# Patient Record
Sex: FEMALE | Race: WHITE | NOT HISPANIC OR LATINO | Employment: FULL TIME | URBAN - METROPOLITAN AREA
[De-identification: names, ages, dates, MRNs, and addresses within clinical notes are randomized per-mention and may not be internally consistent; named-entity substitution may affect disease eponyms.]

---

## 2017-01-01 ENCOUNTER — COMMUNICATION - HEALTHEAST (OUTPATIENT)
Dept: MULTI SPECIALTY CLINIC | Facility: CLINIC | Age: 57
End: 2017-01-01

## 2017-01-01 DIAGNOSIS — C73 MALIGNANT NEOPLASM OF THYROID GLAND (H): ICD-10-CM

## 2017-01-03 ENCOUNTER — COMMUNICATION - HEALTHEAST (OUTPATIENT)
Dept: MULTI SPECIALTY CLINIC | Facility: CLINIC | Age: 57
End: 2017-01-03

## 2017-01-03 DIAGNOSIS — C73 MALIGNANT NEOPLASM OF THYROID GLAND (H): ICD-10-CM

## 2017-05-17 ENCOUNTER — OFFICE VISIT - HEALTHEAST (OUTPATIENT)
Dept: FAMILY MEDICINE | Facility: CLINIC | Age: 57
End: 2017-05-17

## 2017-05-17 DIAGNOSIS — Z79.899 PRESCRIPTION DRUG STARTED: ICD-10-CM

## 2017-05-17 LAB
ATRIAL RATE - MUSE: 60 BPM
DIASTOLIC BLOOD PRESSURE - MUSE: NORMAL MMHG
INTERPRETATION ECG - MUSE: NORMAL
P AXIS - MUSE: 48 DEGREES
PR INTERVAL - MUSE: 164 MS
QRS DURATION - MUSE: 98 MS
QT - MUSE: 444 MS
QTC - MUSE: 444 MS
R AXIS - MUSE: 30 DEGREES
SYSTOLIC BLOOD PRESSURE - MUSE: NORMAL MMHG
T AXIS - MUSE: 72 DEGREES
VENTRICULAR RATE- MUSE: 60 BPM

## 2017-05-17 ASSESSMENT — MIFFLIN-ST. JEOR: SCORE: 1695.78

## 2017-05-18 ENCOUNTER — RECORDS - HEALTHEAST (OUTPATIENT)
Dept: ADMINISTRATIVE | Facility: OTHER | Age: 57
End: 2017-05-18

## 2017-05-18 ENCOUNTER — COMMUNICATION - HEALTHEAST (OUTPATIENT)
Dept: FAMILY MEDICINE | Facility: CLINIC | Age: 57
End: 2017-05-18

## 2017-07-13 ENCOUNTER — COMMUNICATION - HEALTHEAST (OUTPATIENT)
Dept: FAMILY MEDICINE | Facility: CLINIC | Age: 57
End: 2017-07-13

## 2017-07-19 ENCOUNTER — OFFICE VISIT - HEALTHEAST (OUTPATIENT)
Dept: FAMILY MEDICINE | Facility: CLINIC | Age: 57
End: 2017-07-19

## 2017-07-19 DIAGNOSIS — E66.9 OBESITY (BMI 35.0-39.9 WITHOUT COMORBIDITY): ICD-10-CM

## 2017-07-19 DIAGNOSIS — I10 HTN (HYPERTENSION): ICD-10-CM

## 2017-07-19 DIAGNOSIS — K21.9 GERD (GASTROESOPHAGEAL REFLUX DISEASE): ICD-10-CM

## 2017-08-07 ENCOUNTER — COMMUNICATION - HEALTHEAST (OUTPATIENT)
Dept: FAMILY MEDICINE | Facility: CLINIC | Age: 57
End: 2017-08-07

## 2017-08-07 DIAGNOSIS — K21.9 GERD (GASTROESOPHAGEAL REFLUX DISEASE): ICD-10-CM

## 2017-08-07 DIAGNOSIS — I10 HTN (HYPERTENSION): ICD-10-CM

## 2017-08-07 DIAGNOSIS — E66.9 OBESITY (BMI 35.0-39.9 WITHOUT COMORBIDITY): ICD-10-CM

## 2017-08-29 ENCOUNTER — OFFICE VISIT - HEALTHEAST (OUTPATIENT)
Dept: FAMILY MEDICINE | Facility: CLINIC | Age: 57
End: 2017-08-29

## 2017-08-29 DIAGNOSIS — E78.5 HYPERLIPIDEMIA: ICD-10-CM

## 2017-08-29 DIAGNOSIS — K21.00 REFLUX ESOPHAGITIS: ICD-10-CM

## 2017-08-29 DIAGNOSIS — I10 ESSENTIAL HYPERTENSION: ICD-10-CM

## 2017-08-29 DIAGNOSIS — E89.0 POSTSURGICAL HYPOTHYROIDISM: ICD-10-CM

## 2017-08-29 NOTE — ASSESSMENT & PLAN NOTE
HTN  Will monitor in setting of phentermine use.  BP Readings from Last3 Encounters:   08/29/17 124/80   07/19/17 126/84   05/17/17 124/78     Continue lisinopril 10 mg po q day.   Continue hctz 25 mg po q day.

## 2017-09-24 ENCOUNTER — COMMUNICATION - HEALTHEAST (OUTPATIENT)
Dept: FAMILY MEDICINE | Facility: CLINIC | Age: 57
End: 2017-09-24

## 2017-09-24 DIAGNOSIS — E66.9 OBESITY (BMI 35.0-39.9 WITHOUT COMORBIDITY): ICD-10-CM

## 2017-09-24 DIAGNOSIS — K21.9 GERD (GASTROESOPHAGEAL REFLUX DISEASE): ICD-10-CM

## 2017-09-24 DIAGNOSIS — I10 HTN (HYPERTENSION): ICD-10-CM

## 2017-09-27 ENCOUNTER — COMMUNICATION - HEALTHEAST (OUTPATIENT)
Dept: FAMILY MEDICINE | Facility: CLINIC | Age: 57
End: 2017-09-27

## 2017-10-01 ENCOUNTER — COMMUNICATION - HEALTHEAST (OUTPATIENT)
Dept: MULTI SPECIALTY CLINIC | Facility: CLINIC | Age: 57
End: 2017-10-01

## 2017-10-01 DIAGNOSIS — C73 MALIGNANT NEOPLASM OF THYROID GLAND (H): ICD-10-CM

## 2017-10-05 ENCOUNTER — OFFICE VISIT - HEALTHEAST (OUTPATIENT)
Dept: FAMILY MEDICINE | Facility: CLINIC | Age: 57
End: 2017-10-05

## 2017-10-05 ENCOUNTER — COMMUNICATION - HEALTHEAST (OUTPATIENT)
Dept: MULTI SPECIALTY CLINIC | Facility: CLINIC | Age: 57
End: 2017-10-05

## 2017-10-05 ENCOUNTER — COMMUNICATION - HEALTHEAST (OUTPATIENT)
Dept: FAMILY MEDICINE | Facility: CLINIC | Age: 57
End: 2017-10-05

## 2017-10-05 DIAGNOSIS — Z13.1 SCREENING FOR DIABETES MELLITUS: ICD-10-CM

## 2017-10-05 DIAGNOSIS — C73 MALIGNANT NEOPLASM OF THYROID GLAND (H): ICD-10-CM

## 2017-10-05 DIAGNOSIS — I10 ESSENTIAL HYPERTENSION: ICD-10-CM

## 2017-10-05 DIAGNOSIS — E89.0 POSTSURGICAL HYPOTHYROIDISM: ICD-10-CM

## 2017-10-05 DIAGNOSIS — E78.5 HYPERLIPIDEMIA: ICD-10-CM

## 2017-10-05 DIAGNOSIS — K21.00 REFLUX ESOPHAGITIS: ICD-10-CM

## 2017-10-05 ASSESSMENT — MIFFLIN-ST. JEOR: SCORE: 1664.03

## 2017-10-05 NOTE — ASSESSMENT & PLAN NOTE
BP Readings from Last 3 Encounters:   10/05/17 122/76   08/29/17 124/80   07/19/17 126/84     Continue lisinopril 10 mg po q day.   Continue hctz 25 mg po q day.

## 2017-10-05 NOTE — ASSESSMENT & PLAN NOTE
Managed by endocrine.  Lab Results   Component Value Date    TSH 1.17 08/07/2017      Taking synthroid 150 mcg daily

## 2017-11-06 ENCOUNTER — COMMUNICATION - HEALTHEAST (OUTPATIENT)
Dept: FAMILY MEDICINE | Facility: CLINIC | Age: 57
End: 2017-11-06

## 2017-11-06 DIAGNOSIS — E66.9 OBESITY (BMI 35.0-39.9 WITHOUT COMORBIDITY): ICD-10-CM

## 2017-11-06 DIAGNOSIS — I10 HTN (HYPERTENSION): ICD-10-CM

## 2017-11-06 DIAGNOSIS — K21.9 GERD (GASTROESOPHAGEAL REFLUX DISEASE): ICD-10-CM

## 2017-11-09 ENCOUNTER — OFFICE VISIT - HEALTHEAST (OUTPATIENT)
Dept: FAMILY MEDICINE | Facility: CLINIC | Age: 57
End: 2017-11-09

## 2017-11-09 DIAGNOSIS — Z13.1 SCREENING FOR DIABETES MELLITUS: ICD-10-CM

## 2017-11-09 DIAGNOSIS — E78.2 MIXED HYPERLIPIDEMIA: ICD-10-CM

## 2017-11-09 DIAGNOSIS — R73.03 PREDIABETES: ICD-10-CM

## 2017-11-09 DIAGNOSIS — E78.5 HYPERLIPIDEMIA: ICD-10-CM

## 2017-11-09 LAB
CHOLEST SERPL-MCNC: 167 MG/DL
FASTING STATUS PATIENT QL REPORTED: YES
HBA1C MFR BLD: 5.7 % (ref 3.5–6)
HDLC SERPL-MCNC: 48 MG/DL
LDLC SERPL CALC-MCNC: 91 MG/DL
TRIGL SERPL-MCNC: 138 MG/DL

## 2017-11-09 ASSESSMENT — MIFFLIN-ST. JEOR: SCORE: 1664.94

## 2017-11-09 NOTE — ASSESSMENT & PLAN NOTE
Lab Results   Component Value Date    HGBA1C 5.7 11/09/2017      Will discuss at follow up. Weight loss recommended and she is working on this.     Initial Weight: 245.6 lbs  Weight: 238 lb 12.8 oz (108.3 kg)  Weight loss from initial: 6.8  % Weight loss: 2.77 %     Metformin would be expected to lower this number, will prescribed if pt desires.

## 2018-05-23 ENCOUNTER — AMBULATORY - HEALTHEAST (OUTPATIENT)
Dept: ENDOCRINOLOGY | Facility: CLINIC | Age: 58
End: 2018-05-23

## 2018-05-23 DIAGNOSIS — C73 MALIGNANT NEOPLASM OF THYROID GLAND (H): ICD-10-CM

## 2018-06-21 ENCOUNTER — AMBULATORY - HEALTHEAST (OUTPATIENT)
Dept: LAB | Facility: CLINIC | Age: 58
End: 2018-06-21

## 2018-06-21 DIAGNOSIS — C73 MALIGNANT NEOPLASM OF THYROID GLAND (H): ICD-10-CM

## 2018-06-21 LAB
T4 FREE SERPL-MCNC: 1.2 NG/DL (ref 0.7–1.8)
TSH SERPL DL<=0.005 MIU/L-ACNC: 1.08 UIU/ML (ref 0.3–5)

## 2018-06-23 LAB
THYROGLOB AB SERPL-ACNC: <0.9 IU/ML (ref 0–4)
THYROGLOB SERPL-MCNC: ABNORMAL NG/ML (ref 1.3–31.8)
THYROGLOBULIN, SERUM OR: <0.1 NG/ML (ref 1.3–31.8)

## 2018-06-29 ENCOUNTER — OFFICE VISIT - HEALTHEAST (OUTPATIENT)
Dept: ENDOCRINOLOGY | Facility: CLINIC | Age: 58
End: 2018-06-29

## 2018-06-29 DIAGNOSIS — E66.01 MORBID OBESITY (H): ICD-10-CM

## 2018-06-29 DIAGNOSIS — E89.0 POSTSURGICAL HYPOTHYROIDISM: ICD-10-CM

## 2018-06-29 DIAGNOSIS — C73 MALIGNANT NEOPLASM OF THYROID GLAND (H): ICD-10-CM

## 2018-06-29 ASSESSMENT — MIFFLIN-ST. JEOR: SCORE: 1764.28

## 2018-11-23 NOTE — ASSESSMENT & PLAN NOTE
Monitor Summary: A-fib 61-82 with rare couplets. Pt touched down to 36.  -/12/-   Offered lipid recheck, declined.   Continue lipitor 10 mg po q day.

## 2018-12-10 ENCOUNTER — COMMUNICATION - HEALTHEAST (OUTPATIENT)
Dept: LAB | Facility: CLINIC | Age: 58
End: 2018-12-10

## 2018-12-10 DIAGNOSIS — C73 MALIGNANT NEOPLASM OF THYROID GLAND (H): ICD-10-CM

## 2019-03-28 ENCOUNTER — HOSPITAL ENCOUNTER (OUTPATIENT)
Dept: MAMMOGRAPHY | Facility: CLINIC | Age: 59
Discharge: HOME | End: 2019-03-28
Attending: FAMILY MEDICINE

## 2019-03-28 DIAGNOSIS — Z12.31 VISIT FOR SCREENING MAMMOGRAM: ICD-10-CM

## 2019-04-04 ENCOUNTER — AMBULATORY - HEALTHEAST (OUTPATIENT)
Dept: ENDOCRINOLOGY | Facility: CLINIC | Age: 59
End: 2019-04-04

## 2019-04-04 DIAGNOSIS — E89.0 POSTSURGICAL HYPOTHYROIDISM: ICD-10-CM

## 2019-04-15 ENCOUNTER — RECORDS - HEALTHEAST (OUTPATIENT)
Dept: ADMINISTRATIVE | Facility: OTHER | Age: 59
End: 2019-04-15

## 2019-04-15 ENCOUNTER — RECORDS - HEALTHEAST (OUTPATIENT)
Dept: LAB | Facility: CLINIC | Age: 59
End: 2019-04-15

## 2019-04-15 LAB
ALBUMIN SERPL-MCNC: 4 G/DL (ref 3.5–5)
ALP SERPL-CCNC: 52 U/L (ref 45–120)
ALT SERPL W P-5'-P-CCNC: 31 U/L (ref 0–45)
ANION GAP SERPL CALCULATED.3IONS-SCNC: 11 MMOL/L (ref 5–18)
AST SERPL W P-5'-P-CCNC: 25 U/L (ref 0–40)
BILIRUB SERPL-MCNC: 0.5 MG/DL (ref 0–1)
BUN SERPL-MCNC: 20 MG/DL (ref 8–22)
CALCIUM SERPL-MCNC: 9.7 MG/DL (ref 8.5–10.5)
CHLORIDE BLD-SCNC: 105 MMOL/L (ref 98–107)
CHOLEST SERPL-MCNC: 146 MG/DL
CO2 SERPL-SCNC: 25 MMOL/L (ref 22–31)
CREAT SERPL-MCNC: 0.75 MG/DL (ref 0.6–1.1)
FASTING STATUS PATIENT QL REPORTED: NO
GFR SERPL CREATININE-BSD FRML MDRD: >60 ML/MIN/1.73M2
GLUCOSE BLD-MCNC: 99 MG/DL (ref 70–125)
HDLC SERPL-MCNC: 40 MG/DL
LDLC SERPL CALC-MCNC: 66 MG/DL
POTASSIUM BLD-SCNC: 4.3 MMOL/L (ref 3.5–5)
PROT SERPL-MCNC: 6.9 G/DL (ref 6–8)
SODIUM SERPL-SCNC: 141 MMOL/L (ref 136–145)
TRIGL SERPL-MCNC: 202 MG/DL

## 2019-04-16 ENCOUNTER — RECORDS - HEALTHEAST (OUTPATIENT)
Dept: ADMINISTRATIVE | Facility: OTHER | Age: 59
End: 2019-04-16

## 2019-04-16 ENCOUNTER — HOSPITAL ENCOUNTER (OUTPATIENT)
Dept: CARDIOLOGY | Facility: HOSPITAL | Age: 59
Discharge: HOME | End: 2019-04-16
Attending: FAMILY MEDICINE

## 2019-04-16 ENCOUNTER — HOSPITAL ENCOUNTER (OUTPATIENT)
Dept: NUCLEAR MEDICINE | Facility: HOSPITAL | Age: 59
Discharge: HOME | End: 2019-04-16
Attending: FAMILY MEDICINE

## 2019-04-16 DIAGNOSIS — R07.9 CHEST PAIN: ICD-10-CM

## 2019-04-16 LAB
CV STRESS CURRENT BP HE: NORMAL
CV STRESS CURRENT HR HE: 100
CV STRESS CURRENT HR HE: 113
CV STRESS CURRENT HR HE: 115
CV STRESS CURRENT HR HE: 115
CV STRESS CURRENT HR HE: 123
CV STRESS CURRENT HR HE: 127
CV STRESS CURRENT HR HE: 129
CV STRESS CURRENT HR HE: 132
CV STRESS CURRENT HR HE: 134
CV STRESS CURRENT HR HE: 136
CV STRESS CURRENT HR HE: 137
CV STRESS CURRENT HR HE: 138
CV STRESS CURRENT HR HE: 140
CV STRESS CURRENT HR HE: 149
CV STRESS CURRENT HR HE: 150
CV STRESS CURRENT HR HE: 151
CV STRESS CURRENT HR HE: 84
CV STRESS CURRENT HR HE: 89
CV STRESS CURRENT HR HE: 96
CV STRESS CURRENT HR HE: 98
CV STRESS CURRENT HR HE: 99
CV STRESS DEVIATION TIME HE: NORMAL
CV STRESS ECHO PERCENT HR HE: NORMAL
CV STRESS EXERCISE STAGE HE: NORMAL
CV STRESS EXERCISE STAGE REACHED HE: NORMAL
CV STRESS FINAL RESTING BP HE: NORMAL
CV STRESS FINAL RESTING HR HE: 100
CV STRESS MAX HR HE: 151
CV STRESS MAX TREADMILL GRADE HE: 12
CV STRESS MAX TREADMILL SPEED HE: 2.5
CV STRESS PEAK DIA BP HE: NORMAL
CV STRESS PEAK SYS BP HE: NORMAL
CV STRESS PHASE HE: NORMAL
CV STRESS PROTOCOL HE: NORMAL
CV STRESS RESTING PT POSITION HE: NORMAL
CV STRESS RESTING PT POSITION HE: NORMAL
CV STRESS ST DEVIATION AMOUNT HE: NORMAL
CV STRESS ST DEVIATION ELEVATION HE: NORMAL
CV STRESS ST EVELATION AMOUNT HE: NORMAL
CV STRESS TEST TYPE HE: NORMAL
CV STRESS TOTAL STAGE TIME MIN 1 HE: NORMAL
NUC STRESS EJECTION FRACTION: >75 %
STRESS ECHO BASELINE BP: NORMAL
STRESS ECHO BASELINE HR: 83
STRESS ECHO CALCULATED PERCENT HR: 94 %
STRESS ECHO LAST STRESS BP: NORMAL
STRESS ECHO LAST STRESS HR: 150
STRESS ECHO POST ESTIMATED WORKLOAD: 5.8
STRESS ECHO POST EXERCISE DUR MIN: 4
STRESS ECHO POST EXERCISE DUR SEC: 4
STRESS ECHO TARGET HR: 137

## 2019-05-08 ENCOUNTER — AMBULATORY - HEALTHEAST (OUTPATIENT)
Dept: LAB | Facility: CLINIC | Age: 59
End: 2019-05-08

## 2019-05-08 DIAGNOSIS — C73 MALIGNANT NEOPLASM OF THYROID GLAND (H): ICD-10-CM

## 2019-05-08 DIAGNOSIS — E89.0 POSTSURGICAL HYPOTHYROIDISM: ICD-10-CM

## 2019-05-08 LAB
T4 FREE SERPL-MCNC: 1.4 NG/DL (ref 0.7–1.8)
TSH SERPL DL<=0.005 MIU/L-ACNC: 1.67 UIU/ML (ref 0.3–5)

## 2019-05-09 LAB — THYROGLOB AB SERPL-ACNC: <0.9 IU/ML (ref 0–4)

## 2019-05-10 LAB
THYROGLOB AB SERPL-ACNC: <0.9 IU/ML (ref 0–4)
THYROGLOB SERPL-MCNC: ABNORMAL NG/ML (ref 1.3–31.8)
THYROGLOBULIN, SERUM OR: 0.1 NG/ML (ref 1.3–31.8)

## 2019-05-23 ENCOUNTER — OFFICE VISIT - HEALTHEAST (OUTPATIENT)
Dept: ENDOCRINOLOGY | Facility: CLINIC | Age: 59
End: 2019-05-23

## 2019-05-23 ENCOUNTER — OFFICE VISIT - HEALTHEAST (OUTPATIENT)
Dept: FAMILY MEDICINE | Facility: CLINIC | Age: 59
End: 2019-05-23

## 2019-05-23 DIAGNOSIS — C73 MALIGNANT NEOPLASM OF THYROID GLAND (H): ICD-10-CM

## 2019-05-23 DIAGNOSIS — E89.0 POSTSURGICAL HYPOTHYROIDISM: ICD-10-CM

## 2019-05-23 DIAGNOSIS — M25.562 ACUTE PAIN OF LEFT KNEE: ICD-10-CM

## 2019-05-23 RX ORDER — DEXLANSOPRAZOLE 60 MG/1
60 CAPSULE, DELAYED RELEASE ORAL DAILY
Status: SHIPPED | COMMUNITY
Start: 2019-05-23 | End: 2022-06-29

## 2019-05-23 ASSESSMENT — MIFFLIN-ST. JEOR: SCORE: 1772.9

## 2019-07-26 ENCOUNTER — HOSPITAL ENCOUNTER (OUTPATIENT)
Dept: ULTRASOUND IMAGING | Facility: HOSPITAL | Age: 59
Discharge: HOME | End: 2019-07-26

## 2019-07-26 DIAGNOSIS — C73 MALIGNANT NEOPLASM OF THYROID GLAND (H): ICD-10-CM

## 2019-07-29 ENCOUNTER — COMMUNICATION - HEALTHEAST (OUTPATIENT)
Dept: ENDOCRINOLOGY | Facility: CLINIC | Age: 59
End: 2019-07-29

## 2019-09-08 ENCOUNTER — COMMUNICATION - HEALTHEAST (OUTPATIENT)
Dept: ENDOCRINOLOGY | Facility: CLINIC | Age: 59
End: 2019-09-08

## 2019-09-08 DIAGNOSIS — C73 MALIGNANT NEOPLASM OF THYROID GLAND (H): ICD-10-CM

## 2020-01-17 ENCOUNTER — COMMUNICATION - HEALTHEAST (OUTPATIENT)
Dept: LAB | Facility: CLINIC | Age: 60
End: 2020-01-17

## 2020-01-17 ENCOUNTER — RECORDS - HEALTHEAST (OUTPATIENT)
Dept: LAB | Facility: HOSPITAL | Age: 60
End: 2020-01-17

## 2020-01-17 LAB
BASOPHILS # BLD AUTO: 0 THOU/UL (ref 0–0.2)
BASOPHILS NFR BLD AUTO: 1 % (ref 0–2)
C REACTIVE PROTEIN LHE: 0.5 MG/DL (ref 0–0.8)
EOSINOPHIL # BLD AUTO: 0.2 THOU/UL (ref 0–0.4)
EOSINOPHIL NFR BLD AUTO: 3 % (ref 0–6)
ERYTHROCYTE [DISTWIDTH] IN BLOOD BY AUTOMATED COUNT: 13.8 % (ref 11–14.5)
ERYTHROCYTE [SEDIMENTATION RATE] IN BLOOD BY WESTERGREN METHOD: 6 MM/HR (ref 0–20)
HCT VFR BLD AUTO: 39.6 % (ref 35–47)
HGB BLD-MCNC: 13.3 G/DL (ref 12–16)
LYMPHOCYTES # BLD AUTO: 1.3 THOU/UL (ref 0.8–4.4)
LYMPHOCYTES NFR BLD AUTO: 25 % (ref 20–40)
MCH RBC QN AUTO: 31.8 PG (ref 27–34)
MCHC RBC AUTO-ENTMCNC: 33.6 G/DL (ref 32–36)
MCV RBC AUTO: 95 FL (ref 80–100)
MONOCYTES # BLD AUTO: 0.7 THOU/UL (ref 0–0.9)
MONOCYTES NFR BLD AUTO: 13 % (ref 2–10)
NEUTROPHILS # BLD AUTO: 3 THOU/UL (ref 2–7.7)
NEUTROPHILS NFR BLD AUTO: 58 % (ref 50–70)
PLATELET # BLD AUTO: 212 THOU/UL (ref 140–440)
PMV BLD AUTO: 11.5 FL (ref 8.5–12.5)
RBC # BLD AUTO: 4.18 MILL/UL (ref 3.8–5.4)
RHEUMATOID FACT SERPL-ACNC: <15 IU/ML (ref 0–30)
T3 SERPL-MCNC: 78 NG/DL (ref 45–175)
T4 FREE SERPL-MCNC: 1.2 NG/DL (ref 0.7–1.8)
T4 TOTAL - HISTORICAL: 9.4 UG/DL (ref 4.5–13)
TSH SERPL DL<=0.005 MIU/L-ACNC: 1.64 UIU/ML (ref 0.3–5)
URATE SERPL-MCNC: 7 MG/DL (ref 2–7.5)
WBC: 5.2 THOU/UL (ref 4–11)

## 2020-01-20 LAB
ANA SER QL: 9.5 U
LYME TOTAL ANTIBODY - HISTORICAL: 0.07 INDEX VALUE

## 2020-01-21 LAB
DNA (DS) ANTIBODY - HISTORICAL: 2 IU
JO-1 AUTOANTIBODIES - HISTORICAL: 0 EU
SCL-70 AUTOANTIBODIES - HISTORICAL: 0 EU
SM (SMITH AUTOANTIBODIES - HISTORICAL: 12 EU
SM/RNP AUTOANTIBODIES - HISTORICAL: 187 EU
SS-A/RO AUTOANTIBODIES - HISTORICAL: 1 EU
SS-B/LA AUTOANTIBODIES - HISTORICAL: 1 EU

## 2020-05-07 ENCOUNTER — AMBULATORY - HEALTHEAST (OUTPATIENT)
Dept: ENDOCRINOLOGY | Facility: CLINIC | Age: 60
End: 2020-05-07

## 2020-05-07 DIAGNOSIS — E89.0 POSTSURGICAL HYPOTHYROIDISM: ICD-10-CM

## 2020-05-15 ENCOUNTER — AMBULATORY - HEALTHEAST (OUTPATIENT)
Dept: LAB | Facility: CLINIC | Age: 60
End: 2020-05-15

## 2020-05-15 DIAGNOSIS — E89.0 POSTSURGICAL HYPOTHYROIDISM: ICD-10-CM

## 2020-05-15 LAB
T4 FREE SERPL-MCNC: 1 NG/DL (ref 0.7–1.8)
TSH SERPL DL<=0.005 MIU/L-ACNC: 4.85 UIU/ML (ref 0.3–5)

## 2020-05-27 ENCOUNTER — COMMUNICATION - HEALTHEAST (OUTPATIENT)
Dept: ENDOCRINOLOGY | Facility: CLINIC | Age: 60
End: 2020-05-27

## 2020-06-04 ENCOUNTER — OFFICE VISIT - HEALTHEAST (OUTPATIENT)
Dept: ENDOCRINOLOGY | Facility: CLINIC | Age: 60
End: 2020-06-04

## 2020-06-04 DIAGNOSIS — C73 MALIGNANT NEOPLASM OF THYROID GLAND (H): ICD-10-CM

## 2020-06-04 DIAGNOSIS — E89.0 POSTSURGICAL HYPOTHYROIDISM: ICD-10-CM

## 2020-06-04 RX ORDER — AMLODIPINE BESYLATE 10 MG/1
TABLET ORAL
Status: SHIPPED | COMMUNITY
Start: 2020-05-26

## 2020-06-04 RX ORDER — HYDROXYCHLOROQUINE SULFATE 200 MG/1
TABLET, FILM COATED ORAL
Status: SHIPPED | COMMUNITY
Start: 2020-06-04 | End: 2024-03-19

## 2020-06-05 ENCOUNTER — COMMUNICATION - HEALTHEAST (OUTPATIENT)
Dept: ENDOCRINOLOGY | Facility: CLINIC | Age: 60
End: 2020-06-05

## 2020-09-12 ENCOUNTER — RECORDS - HEALTHEAST (OUTPATIENT)
Dept: ADMINISTRATIVE | Facility: OTHER | Age: 60
End: 2020-09-12

## 2020-11-10 ENCOUNTER — RECORDS - HEALTHEAST (OUTPATIENT)
Dept: LAB | Facility: CLINIC | Age: 60
End: 2020-11-10

## 2020-11-10 LAB
ALBUMIN SERPL-MCNC: 4.3 G/DL (ref 3.5–5)
ALP SERPL-CCNC: 60 U/L (ref 45–120)
ALT SERPL W P-5'-P-CCNC: 18 U/L (ref 0–45)
ANION GAP SERPL CALCULATED.3IONS-SCNC: 8 MMOL/L (ref 5–18)
AST SERPL W P-5'-P-CCNC: 15 U/L (ref 0–40)
BILIRUB SERPL-MCNC: 0.4 MG/DL (ref 0–1)
BUN SERPL-MCNC: 12 MG/DL (ref 8–22)
CALCIUM SERPL-MCNC: 9.2 MG/DL (ref 8.5–10.5)
CHLORIDE BLD-SCNC: 105 MMOL/L (ref 98–107)
CHOLEST SERPL-MCNC: 166 MG/DL
CO2 SERPL-SCNC: 29 MMOL/L (ref 22–31)
CREAT SERPL-MCNC: 0.71 MG/DL (ref 0.6–1.1)
FASTING STATUS PATIENT QL REPORTED: ABNORMAL
GFR SERPL CREATININE-BSD FRML MDRD: >60 ML/MIN/1.73M2
GLUCOSE BLD-MCNC: 83 MG/DL (ref 70–125)
HDLC SERPL-MCNC: 50 MG/DL
LDLC SERPL CALC-MCNC: 86 MG/DL
LDLC SERPL CALC-MCNC: ABNORMAL MG/DL
POTASSIUM BLD-SCNC: 4.1 MMOL/L (ref 3.5–5)
PROT SERPL-MCNC: 7.5 G/DL (ref 6–8)
SODIUM SERPL-SCNC: 142 MMOL/L (ref 136–145)
TRIGL SERPL-MCNC: 415 MG/DL

## 2020-12-10 ENCOUNTER — RECORDS - HEALTHEAST (OUTPATIENT)
Dept: SCHEDULING | Facility: CLINIC | Age: 60
End: 2020-12-10

## 2020-12-10 DIAGNOSIS — Z12.31 VISIT FOR SCREENING MAMMOGRAM: ICD-10-CM

## 2021-05-25 ENCOUNTER — RECORDS - HEALTHEAST (OUTPATIENT)
Dept: ADMINISTRATIVE | Facility: CLINIC | Age: 61
End: 2021-05-25

## 2021-05-29 NOTE — PROGRESS NOTES
[facility], [place]  ENDOCRINOLOGY    Thyroid Note  5/24/2019    Bee Ernst, 1960, [id number]          Reason for visit      1. Postsurgical hypothyroidism    2. Papillary Carcinoma Of The Thyroid Gland        HPI     Bee Ernst is a very pleasant 59 y.o. old female who presents for follow up.  SUMMARY:  1. Papillary thyroid carcinoma. status post resection by Dr. Baldwin in 02/2008.    Her thyroid cancer was discovered incidentally on a CT of the chest. She had a 2.6    x 2.6 x 1.4 cm papillary carcinoma in the left lobe, which was unifocal. No    lymph nodes were submitted for evaluation. Postoperative thyroid hormone    withdrawal total body scan revealed uptake in the neck of 3.9% at 6 hours and    5.9% at 24 hours. Her TSH at that time was 56 with a concurrent    thyroglobulin of 7.4. She received 70 mCi of radioactive iodine for ablative    purposes. Post-therapy scan showed uptake in her thyroid bed and salivary    glands only. No evidence of metastatic disease.    The patient completed a thyrogen-stimulated total body scan in 2009. With    thyrogen stimulation, her TSH was 87 and thyroglobulin was undetectable. Her    scan revealed no abnormal uptake in the neck or elsewhere.    Neck US 9/2010 and March 2011 showed stable enlarged LN in the neck but no suspicious lesions on the thyroid bed or elsewhere. FNA of left neck LN was negative in 9/2008.    Most recent neck US in Jan 2012 was normal.    12/2012 US: 8mm lesion in L thyroid bed and enlarging level 3 LN in left neck. FNA was benign.  4/10/2014: Ultrasound no evidence of recurrent or metastatic disease in the neck. 9 mm benign-appearing superior left internal jugular chain lymph node  2. Postsurgical hypothyroidism-she is now8  years post thyroidectomy and can be gradually transitioned to replacement doses    TODAY:  Bee returns today in f/u for postsurgical hypothyroidism.  She reports that she is feeling well. She is very excited about her new  "treatment for Reflux (dexlansoprazole), because she has been gut pain free since starting it. She reports that she went on vacation recently and that she gained 10 lbs. She states that it will take her about a month to lose it all.  She has been on and off the Keto diet over the last year.  She states that she has had lots of stress at work.  She is having no problems referable to her neck. She is taking Levothyroxine 150 mcg daily on an empty stomach.  Her current TSH is 1.67 and Free T 4 is 1.4. Her Tumor Marker was < 0.9.     Past Medical History     Patient Active Problem List   Diagnosis     BMI 39.0-39.9,adult     Chronic Reflux Esophagitis     Changed Sexual Interest (Libido): Decreased     Papillary Carcinoma Of The Thyroid Gland     Postsurgical Hypothyroidism     Skin Tag (___ Mm)     Essential hypertension     Hyperlipidemia     Prediabetes     Morbid obesity (H)       Family History       family history includes Breast cancer (age of onset: 62) in her mother.    Social History      reports that she quit smoking about 10 years ago. She has never used smokeless tobacco. She reports that she drinks alcohol. She reports that she does not use drugs.      Review of Systems     Patient denies fatigue, weight changes, heat/cold intolerance, bowel/skin changes or CVS symptoms.   Remainder per HPI and per attached intake form.      Vital Signs     /78 (Patient Site: Right Arm, Patient Position: Sitting, Cuff Size: Adult Large)   Pulse 80   Ht 5' 6\" (1.676 m)   Wt (!) 262 lb 9.6 oz (119.1 kg)   LMP 12/14/2007   BMI 42.38 kg/m    Wt Readings from Last 3 Encounters:   05/23/19 (!) 262 lb (118.8 kg)   05/23/19 (!) 262 lb 9.6 oz (119.1 kg)   06/29/18 (!) 260 lb 11.2 oz (118.3 kg)       Physical Exam     General:  Normal, NIRD,appears euthyroid  Eyes:  Pupils equal, round and reactive to light; no proptosis, lid lag or  periorbital edema.  Thyroid:  Thyroid is normal.  No tenderness or bruit  Neck: No lymph " nodes  Musculoskeletal:  Muscle strength grossly normal without evidence of wasting.  Heart:  Regular rate and rhythm without murmur.  Lungs:  Clear to auscultation.  Abdomen: Soft, non-tender, no masses or organomegaly  Neuro: Patella Reflexes were normal.No tremors  Skin:  No acanthosis nigricans or vitiligo        Assessment     1. Postsurgical hypothyroidism    2. Papillary Carcinoma Of The Thyroid Gland            Plan     Pt is bio-chemically and physically euthyroid. She will continue on Levothyroxine 150 mcg daily.  She will f/u with me in 1 year, sooner with problems. Time spent with pt today: 25 min with >50% spent in counseling and coordination of care.          Christine MINA Piero   Endocrinology  5/24/2019  11:21 AM      Lab Results     TSH   Date Value Ref Range Status   05/08/2019 1.67 0.30 - 5.00 uIU/mL Final     T3, Total   Date Value Ref Range Status   01/12/2012 83 45 - 175 ng/dL Final     No results found for: THYROIDAB    No results found for: L2UDXVF    Imaging Results   Last thyroid ultrasound:  Results for orders placed during the hospital encounter of 04/06/16   US Thyroid    Narrative US THYROID  4/6/2016 5:26 PM    INDICATION: Malignant neoplasm of thyroid gland  TECHNIQUE: Routine.  COMPARISON: 4/10/2014.    FINDINGS:  The thyroid is surgically absent. No recurrent or residual thyroid tissue or masses. There is a mildly enlarged left cervical lymph node in the jugulodigastric chain at the level of thyroid cartilage measuring 1.7 x 1 x 1 cm. It appears diffusely   hypoechoic with loss of fatty hilum. Additional small cervical nodes with normal marrow morphology are noted bilaterally.      Impression CONCLUSION:  1.  Status post thyroidectomy. No residual or recurrent mass in the thyroid bed.  2.  1. Borderline enlarged left cervical lymph node which shows loss of the normal morphology. I cannot exclude early lymph node metastasis.    NOTE: ABNORMAL REPORT    THE DICTATION ABOVE DESCRIBES AN  ABNORMALITY FOR WHICH FOLLOW-UP IS NEEDED.        Last thyroid nuclear scan:  No results found for this or any previous visit.    Current Medications     Outpatient Medications Prior to Visit   Medication Sig Dispense Refill     dexlansoprazole (DEXILANT) 60 mg capsule Take 60 mg by mouth daily.       atorvastatin (LIPITOR) 10 MG tablet Take 1 tablet by mouth daily.       calcium carbonate (CALCIUM CARBONATE) 300 mg (750 mg) Chew Chew 2-4 tablets daily.       hydrochlorothiazide (HYDRODIURIL) 25 MG tablet Take 12.5 mg by mouth daily.        levothyroxine (SYNTHROID) 150 MCG tablet Take 1 tablet (150 mcg total) by mouth daily. 90 tablet 3     lisinopril (PRINIVIL,ZESTRIL) 10 MG tablet Take 5 mg by mouth daily.       multivitamin (MULTIVITAMIN) per tablet Take 1 tablet by mouth daily.       RANITIDINE HCL (ACID REDUCER, RANITIDINE, ORAL) Take 300 mg by mouth daily.        lansoprazole (PREVACID) 30 MG capsule Take 30 mg by mouth 2 (two) times a day.        phentermine (ADIPEX-P) 37.5 mg tablet Take one tablet (37.5 Mg) by mouth daily before breakfast 30 tablet 0     No facility-administered medications prior to visit.

## 2021-05-30 ENCOUNTER — COMMUNICATION - HEALTHEAST (OUTPATIENT)
Dept: ENDOCRINOLOGY | Facility: CLINIC | Age: 61
End: 2021-05-30

## 2021-05-30 DIAGNOSIS — C73 MALIGNANT NEOPLASM OF THYROID GLAND (H): ICD-10-CM

## 2021-05-30 NOTE — TELEPHONE ENCOUNTER
----- Message from Christine Harry NP sent at 7/29/2019 11:43 AM CDT -----  Please let pt know that the lymph node that we were looking at has not changed from the last time they looked at it in 2016.  They give us the option to just follow or go ahead and get a biopsy done to rule out anything sinister

## 2021-05-31 ENCOUNTER — RECORDS - HEALTHEAST (OUTPATIENT)
Dept: ADMINISTRATIVE | Facility: CLINIC | Age: 61
End: 2021-05-31

## 2021-05-31 VITALS — HEIGHT: 66 IN | BODY MASS INDEX: 38.35 KG/M2 | WEIGHT: 238.6 LBS

## 2021-05-31 VITALS — BODY MASS INDEX: 38.38 KG/M2 | WEIGHT: 238.8 LBS | HEIGHT: 66 IN

## 2021-05-31 VITALS — WEIGHT: 238 LBS | BODY MASS INDEX: 38.41 KG/M2

## 2021-05-31 VITALS — WEIGHT: 245.6 LBS | HEIGHT: 66 IN | BODY MASS INDEX: 39.47 KG/M2

## 2021-05-31 VITALS — BODY MASS INDEX: 39.38 KG/M2 | WEIGHT: 244 LBS

## 2021-06-01 ENCOUNTER — RECORDS - HEALTHEAST (OUTPATIENT)
Dept: ADMINISTRATIVE | Facility: CLINIC | Age: 61
End: 2021-06-01

## 2021-06-01 VITALS — HEIGHT: 66 IN | BODY MASS INDEX: 41.9 KG/M2 | WEIGHT: 260.7 LBS

## 2021-06-02 ENCOUNTER — RECORDS - HEALTHEAST (OUTPATIENT)
Dept: ADMINISTRATIVE | Facility: CLINIC | Age: 61
End: 2021-06-02

## 2021-06-03 ENCOUNTER — RECORDS - HEALTHEAST (OUTPATIENT)
Dept: ADMINISTRATIVE | Facility: CLINIC | Age: 61
End: 2021-06-03

## 2021-06-03 VITALS — WEIGHT: 262.6 LBS | HEIGHT: 66 IN | BODY MASS INDEX: 42.2 KG/M2

## 2021-06-03 VITALS — WEIGHT: 262 LBS | BODY MASS INDEX: 42.29 KG/M2

## 2021-06-05 NOTE — TELEPHONE ENCOUNTER
Who is calling:  Patient    Reason for Call:  Same day labs ra panel, sent fax of orders   Date of last appointment with primary care:    Okay to leave a detailed message: Yes

## 2021-06-07 RX ORDER — LEVOTHYROXINE SODIUM 150 UG/1
150 TABLET ORAL DAILY
Qty: 42 TABLET | Refills: 0 | Status: SHIPPED | OUTPATIENT
Start: 2021-06-07 | End: 2021-07-19

## 2021-06-08 NOTE — PROGRESS NOTES
"Bee Ernst is a 60 y.o. female who is being evaluated via a billable video visit.      The patient has been notified of following:     \"This video visit will be conducted via a call between you and your physician/provider. We have found that certain health care needs can be provided without the need for an in-person physical exam.  This service lets us provide the care you need with a video conversation.  If a prescription is necessary we can send it directly to your pharmacy.  If lab work is needed we can place an order for that and you can then stop by our lab to have the test done at a later time.    Video visits are billed at different rates depending on your insurance coverage. Please reach out to your insurance provider with any questions.    If during the course of the call the physician/provider feels a video visit is not appropriate, you will not be charged for this service.\"    Patient has given verbal consent to a Video visit? Yes    Patient would like to receive their AVS by AVS Preference: Sandra.    Patient would like the video invitation sent by: Send to e-mail at: [email or web address]    Will anyone else be joining your video visit? No        Video Start Time: 1120    Additional provider notes:     Reason for visit      1. Postsurgical hypothyroidism    2. Papillary Carcinoma Of The Thyroid Gland        HPI     Bee Ernst is a very pleasant 60 y.o. old female who presents for follow up.  SUMMARY:    Bee is contacted today via Video Visit in f/u for postsurgical hypothyroidism. She reports that she is seeing Rheumatology for her Raynaud's Syndrome and is taking Amlodapine. She is taking Hydroxychloroquine for her Fibromyalgia. She notes that she is experiencing some significant fatigue and hair loss at present and is interested in increasing her dosage. She is currently taking 150 mcg daily. Her current TSH is 4.85 and up significantly from the 1.64 it was in January. Her fT4 is 1.0. She is having " no problems attributable to her neck.     Past Medical History     Patient Active Problem List   Diagnosis     BMI 39.0-39.9,adult     Chronic Reflux Esophagitis     Changed Sexual Interest (Libido): Decreased     Papillary Carcinoma Of The Thyroid Gland     Postsurgical Hypothyroidism     Skin Tag (___ Mm)     Essential hypertension     Hyperlipidemia     Prediabetes     Morbid obesity (H)     Gastroesophageal reflux disease     History of malignant neoplasm of thyroid     Hypothyroidism     Inflammatory arthritis     Other specified abnormal immunological findings in serum     Raynaud's disease     Sciatica       Family History       family history includes Breast cancer (age of onset: 62) in her mother.    Social History      reports that she quit smoking about 11 years ago. She has never used smokeless tobacco. She reports current alcohol use. She reports that she does not use drugs.      Review of Systems     Patient denies fatigue, weight changes, heat/cold intolerance, bowel/skin changes or CVS symptoms.   Remainder per HPI and per attached intake form.      Vital Signs     LMP 12/14/2007   Wt Readings from Last 3 Encounters:   05/23/19 (!) 262 lb (118.8 kg)   05/23/19 (!) 262 lb 9.6 oz (119.1 kg)   06/29/18 (!) 260 lb 11.2 oz (118.3 kg)             Assessment     1. Postsurgical hypothyroidism    2. Papillary Carcinoma Of The Thyroid Gland            Plan     Pt will increase her dose of Levothyroxine 150 mcg daily by one extra tablet a week and we will recheck labs in 2 months. She will f/u with me in 1 year.             Lab Results     TSH   Date Value Ref Range Status   05/15/2020 4.85 0.30 - 5.00 uIU/mL Final     T3, Total   Date Value Ref Range Status   01/17/2020 78 45 - 175 ng/dL Final     T4, Total   Date Value Ref Range Status   01/17/2020 9.4 4.5 - 13.0 ug/dL Final     No results found for: THYROIDAB    T4, Total   Date Value Ref Range Status   01/17/2020 9.4 4.5 - 13.0 ug/dL Final       Imaging  Results   Last thyroid ultrasound:  Results for orders placed during the hospital encounter of 07/26/19   US Thyroid    Narrative EXAM: US THYROID  LOCATION: [facility]  DATE/TIME: 7/26/2019 4:16 PM    INDICATION: History of papillary carcinoma of the thyroid gland, status post thyroidectomy.  COMPARISON: 4/6/2016.  TECHNIQUE: Routine.    FINDINGS:  Thyroidectomy.    Abnormal lymph node between the left internal jugular vein and carotid artery measures 1.6 x 0.9 x 1.1 cm, not appreciably changed when compared to previous. Smaller lymph node lateral to the internal jugular vein measures 5 mm in short axis.      Impression CONCLUSION:  1.  Abnormal left cervical lymph node is indeterminate, not appreciably changed since 4/6/2016. Ultrasound-guided FNA could be performed as clinically needed.         Last thyroid nuclear scan:  No results found for this or any previous visit.    Current Medications     Outpatient Medications Prior to Visit   Medication Sig Dispense Refill     amLODIPine (NORVASC) 10 MG tablet        aspirin 81 MG EC tablet Take 81 mg by mouth daily.       atorvastatin (LIPITOR) 10 MG tablet Take 1 tablet by mouth daily.       cholecalciferol, vitamin D3, (VITAMIN D3 ORAL) Take by mouth.       dexlansoprazole (DEXILANT) 60 mg capsule Take 60 mg by mouth daily.       diclofenac sodium (VOLTAREN) 1 % Gel APPLY 2 GRAMS TO AFFECTED AREA 3 TIMES A DAY       guaifenesin/phenylephrine HCl (MUCINEX COLD ORAL) Take by mouth.       hydrOXYchloroQUINE (PLAQUENIL) 200 mg tablet 2 tab(s)       lisinopril (PRINIVIL,ZESTRIL) 10 MG tablet Take 5 mg by mouth daily.       multivitamin (MULTIVITAMIN) per tablet Take 1 tablet by mouth daily.       levothyroxine (SYNTHROID, LEVOTHROID) 150 MCG tablet TAKE 1 TABLET BY MOUTH EVERY DAY 90 tablet 2     calcium carbonate (CALCIUM CARBONATE) 300 mg (750 mg) Chew Chew 2-4 tablets daily.       hydrochlorothiazide (HYDRODIURIL) 25 MG tablet Take 12.5 mg by mouth daily.         RANITIDINE HCL (ACID REDUCER, RANITIDINE, ORAL) Take 300 mg by mouth daily.        No facility-administered medications prior to visit.              Video-Visit Details    Type of service:  Video Visit    Video End Time:  1145  Originating Location (pt. Location): Home    Distant Location (provider location):  [facility] [place] ENDOCRINOLOGY     Platform used for Video Visit: Eula

## 2021-06-10 NOTE — PROGRESS NOTES
ASSESSMENT AND PLAN: We spent 40 minutes today in direct patient contact, 100% of the time in consultation concerning medical problems as listed below including pathophysiology of weight loss resistance, slope of weight loss curve, difficulties of weaning phentermine, difficulties of maintaining lost weight, down falls to nutrasystem diet, merit of losing weight slowly, insulin resistance and making small manageable changes into habits before making more changes.       SUMMARY  Tr Anderson MD - PCP  Start weight - 245 lbs, 5/17/2017  Goal weight: 221 - 233 lbs (5-10% loss)  Dx: htn, gerd  Prescribed meds: phentermine - has uses in past and lost, but gained back, need to wean slowly.  Supplements: fish oil, mvi, vit D, chromium  Goals this month: start tracking macronutrients.  Reminder labs due in Aug 2017.   Follow up monthly.    Recommended macronutrients;   Calories: 1500 daily  Protein 110 grams per day  carbs 50-75 grams per day     HTN  Will monitor in setting of phentermine use.  BP Readings from Last 3 Encounters:   05/17/17 124/78   04/19/16 124/78   02/24/16 126/80     Chief Complaint   Patient presents with     Weight Loss Intake     Restart of program     HPI  Bee Ernst is a 57 y.o. female comes in to address htn with weight reduction, she is here for medically assisted weight reduction having tried to lose in the past and failing attain and maintain healthy weight on her own.    History   Smoking Status     Former Smoker   Smokeless Tobacco     Never Used      Current Outpatient Prescriptions   Medication Sig Dispense Refill     atorvastatin (LIPITOR) 10 MG tablet Take 1 tablet by mouth daily.       calcium carbonate (CALCIUM CARBONATE) 300 mg (750 mg) Chew Chew 2-4 tablets daily.       hydrochlorothiazide (HYDRODIURIL) 25 MG tablet Take 12.5 mg by mouth daily.        lansoprazole (PREVACID) 30 MG capsule Take 30 mg by mouth 2 (two) times a day.        lisinopril (PRINIVIL,ZESTRIL) 10 MG tablet  "Take 5 mg by mouth daily.       multivitamin (MULTIVITAMIN) per tablet Take 1 tablet by mouth daily.       RANITIDINE HCL (ACID REDUCER, RANITIDINE, ORAL) Take 300 mg by mouth daily.        SYNTHROID 150 mcg tablet TAKE 1 TABLET (150 MCG TOTAL) BY MOUTH DAILY. 90 tablet 1     phentermine (ADIPEX-P) 37.5 mg tablet Take 1 tablet (37.5 mg total) by mouth every morning before breakfast. 30 tablet 0     No current facility-administered medications for this visit.      No Known Allergies  Review of Systems   Constitutional: Negative.    HENT: Negative.    Eyes: Negative.    Respiratory: Negative.    Cardiovascular: Negative.    Gastrointestinal: Negative.    Endocrine: Negative.    Genitourinary: Negative.    Musculoskeletal: Negative.    Skin: Negative.    Neurological: Negative.    Hematological: Negative.    Psychiatric/Behavioral: Negative.      OBJECTIVE: Blood pressure 124/78, pulse 68, resp. rate 12, height 5' 6\" (1.676 m), weight (!) 245 lb 9.6 oz (111.4 kg), last menstrual period 12/14/2007, not currently breastfeeding.  Physical Exam   Constitutional: She is oriented to person, place, and time. She appears well-developed and well-nourished.   HENT:   Head: Normocephalic and atraumatic.   Eyes: Conjunctivae are normal.   Cardiovascular: Normal rate and regular rhythm.    Pulmonary/Chest: Effort normal.   Neurological: She is alert and oriented to person, place, and time.   Skin: Skin is warm and dry.   Psychiatric: She has a normal mood and affect.        "

## 2021-06-11 NOTE — PROGRESS NOTES
ASSESSMENT AND PLAN: We spent 25 minutes today in direct patient contact, 100% of the time in consultation concerning medical problems as listed.      SUMMARY  Tr Anderson MD - PCP  Start weight - 245 lbs, 5/17/2017 - 244 7/19/2017   Goal weight: 221 - 233 lbs (5-10% loss) by 2/2018  Dx: htn, gerd  Prescribed meds: phentermine - has uses in past and lost, but gained back, need to wean slowly.  Supplements: fish oil, mvi, vit D, chromium  Goals this month: start tracking macronutrients.  Reminder labs due in Aug 2017.  Future: introducing weaning phentermine if not effective.  Follow up monthly.    Recommended macronutrients;   Calories: 1500 daily  Protein 110 grams per day  carbs 50-75 grams per day     HTN  Will monitor in setting of phentermine use.  BP Readings from Last 3 Encounters:   07/19/17 126/84   05/17/17 124/78   04/19/16 124/78     Chief Complaint   Patient presents with     Weight Loss     HPI  Bee Ernst is a 57 y.o. female comes in to address htn with weight reduction, she is here for medically assisted weight reduction having tried to lose in the past and failing attain and maintain healthy weight on her own.    She says logging helps and that insomnia makes it really hard for her.  She has not seen her primary recently but does need a primary MD.     Are you experiencing any side effects to the medications:  No  Hunger control:  good  Exercise was discussed: no  Taking supplements:  yes  Discussed journaling food:  Yes. Mentions eating grilled chicken with salad for dinner or atkins frozen meals and then alcohol on weekends/ holidays. Likes tropical drinks but tries to substitute 'sparkling water' which she describes as a low carb etoh beverage.  She also says that she has late night skinny pop mini cakes or greek yogurt bars.  Patient is pleased with the current results:  Feels really frustrated right now. And not happy  The patient is following the nutrition plan:  She says meal planning  and weight loss resistance are hard for her.   Barriers to losing weight:  Metabolism:   IR      History   Smoking Status     Former Smoker   Smokeless Tobacco     Never Used      Current Outpatient Prescriptions   Medication Sig Dispense Refill     atorvastatin (LIPITOR) 10 MG tablet Take 1 tablet by mouth daily.       calcium carbonate (CALCIUM CARBONATE) 300 mg (750 mg) Chew Chew 2-4 tablets daily.       hydrochlorothiazide (HYDRODIURIL) 25 MG tablet Take 12.5 mg by mouth daily.        lansoprazole (PREVACID) 30 MG capsule Take 30 mg by mouth 2 (two) times a day.        lisinopril (PRINIVIL,ZESTRIL) 10 MG tablet Take 5 mg by mouth daily.       multivitamin (MULTIVITAMIN) per tablet Take 1 tablet by mouth daily.       RANITIDINE HCL (ACID REDUCER, RANITIDINE, ORAL) Take 300 mg by mouth daily.        SYNTHROID 150 mcg tablet TAKE 1 TABLET (150 MCG TOTAL) BY MOUTH DAILY. 90 tablet 1     No current facility-administered medications for this visit.      No Known Allergies  Review of Systems   Constitutional: Negative.    HENT: Negative.    Eyes: Negative.    Respiratory: Negative.    Cardiovascular: Negative.    Gastrointestinal: Negative.    Endocrine: Negative.    Genitourinary: Negative.    Musculoskeletal: Negative.    Skin: Negative.    Neurological: Negative.    Hematological: Negative.    Psychiatric/Behavioral: Negative.      OBJECTIVE: Blood pressure 126/84, pulse 80, resp. rate 20, weight (!) 244 lb (110.7 kg), last menstrual period 12/14/2007, not currently breastfeeding.  Physical Exam   Constitutional: She is oriented to person, place, and time. She appears well-developed and well-nourished.   HENT:   Head: Normocephalic and atraumatic.   Eyes: Conjunctivae are normal.   Cardiovascular: Normal rate and regular rhythm.    Pulmonary/Chest: Effort normal.   Neurological: She is alert and oriented to person, place, and time.   Skin: Skin is warm and dry.   Psychiatric: She has a normal mood and affect.

## 2021-06-12 NOTE — PROGRESS NOTES
ASSESSMENT AND PLAN: We spent 25 minutes today in direct patient contact, 100% of the time in consultation concerning medical problems as listed.      SUMMARY  Tr Anderson MD - PCP  Start weight - 245 lbs, 5/17/2017 - 238 9/10/2017   Goal weight: 221 - 233 lbs (5-10% loss) by 2/2018  Dx: htn, gerd  Prescribed meds: phentermine - has uses in past and lost, but gained back, need to wean slowly.  Supplements: fish oil, mvi, vit D, chromium  Goals this month:  start tracking macronutrients.  Reminder labs due in Aug 2017.  Future: introducing weaning phentermine if not effective.  Follow up monthly.    Recommended macronutrients;   Calories: 1500 daily  Protein 110 grams per day  carbs 50-75 grams per day     Problem List Items Addressed This Visit     Chronic Reflux Esophagitis     Still taking ranitidine. Says her gerd is still 'terrible'.         Postsurgical Hypothyroidism     Lab Results   Component Value Date    TSH 1.17 08/07/2017               Essential hypertension     HTN  Will monitor in setting of phentermine use.  BP Readings from Last 3 Encounters:   08/29/17 124/80   07/19/17 126/84   05/17/17 124/78     Continue lisinopril 10 mg po q day.   Continue hctz 25 mg po q day.          Hyperlipidemia     Offered lipid recheck, declined.   Continue lipitor 10 mg po q day.                  Chief Complaint   Patient presents with     Weight Loss     HPI  Bee Ernst is a 57 y.o. female comes in to address htn with weight reduction, she is here for medically assisted weight reduction having tried to lose in the past and failing attain and maintain healthy weight on her own.    She says logging helps and that insomnia makes it really hard for her.  She has not seen her primary recently but does need a primary MD.     Are you experiencing any side effects to the medications:  No  Hunger control:  good  Exercise was discussed: no  Taking supplements:  yes  Discussed journaling food:  Yes. Mentions eating  grilled chicken with salad for dinner or atkins frozen meals and then alcohol on weekends/ holidays. Likes tropical drinks but tries to substitute 'sparkling water' which she describes as a low carb etoh beverage.  She also says that she has late night skinny pop mini cakes or greek yogurt bars.  Patient is pleased with the current results:  Feels really frustrated right now. And not happy  The patient is following the nutrition plan:  She says meal planning and weight loss resistance are hard for her.   Barriers to losing weight:  Metabolism:   IR      History   Smoking Status     Former Smoker   Smokeless Tobacco     Never Used      Current Outpatient Prescriptions   Medication Sig Dispense Refill     atorvastatin (LIPITOR) 10 MG tablet Take 1 tablet by mouth daily.       calcium carbonate (CALCIUM CARBONATE) 300 mg (750 mg) Chew Chew 2-4 tablets daily.       hydrochlorothiazide (HYDRODIURIL) 25 MG tablet Take 12.5 mg by mouth daily.        lansoprazole (PREVACID) 30 MG capsule Take 30 mg by mouth 2 (two) times a day.        lisinopril (PRINIVIL,ZESTRIL) 10 MG tablet Take 5 mg by mouth daily.       multivitamin (MULTIVITAMIN) per tablet Take 1 tablet by mouth daily.       RANITIDINE HCL (ACID REDUCER, RANITIDINE, ORAL) Take 300 mg by mouth daily.        SYNTHROID 150 mcg tablet TAKE 1 TABLET (150 MCG TOTAL) BY MOUTH DAILY. 90 tablet 1     No current facility-administered medications for this visit.      No Known Allergies  Review of Systems   Constitutional: Negative.    HENT: Negative.    Eyes: Negative.    Respiratory: Negative.    Cardiovascular: Negative.    Gastrointestinal: Negative.    Endocrine: Negative.    Genitourinary: Negative.    Musculoskeletal: Negative.    Skin: Negative.    Neurological: Negative.    Hematological: Negative.    Psychiatric/Behavioral: Negative.      OBJECTIVE: Blood pressure 124/80, pulse 80, resp. rate 14, weight (!) 238 lb (108 kg), last menstrual period 12/14/2007, not  currently breastfeeding.  Physical Exam   Constitutional: She is oriented to person, place, and time. She appears well-developed and well-nourished.   HENT:   Head: Normocephalic and atraumatic.   Eyes: Conjunctivae are normal.   Cardiovascular: Normal rate and regular rhythm.    Pulmonary/Chest: Effort normal.   Neurological: She is alert and oriented to person, place, and time.   Skin: Skin is warm and dry.   Psychiatric: She has a normal mood and affect.

## 2021-06-13 NOTE — PROGRESS NOTES
ASSESSMENT AND PLAN: We spent 25 minutes today in direct patient contact, 100% of the time in consultation concerning medical problems as listed.      SUMMARY  Tr Anderson MD - PCP  Start weight - 245 lbs, 5/17/2017 - 238 10/5/2017   Goal weight: 221 - 233 lbs (5-10% loss) by 2/2018  Dx: htn, gerd  Prescribed meds: phentermine - has uses in past and lost, but gained back, need to wean slowly.  Supplements: fish oil, mvi, vit D, chromium  Goals this month:  start tracking macronutrients.  Reminder labs due in Aug 2017.  Future: trigger foods talked about eating 'little halloween candies'. introducing weaning phentermine if not effective.  Follow up monthly.    Recommended macronutrients;   Calories: 1500 daily  Protein 110 grams per day  carbs 50-75 grams per day     Problem List Items Addressed This Visit     BMI 39.0-39.9,adult    Relevant Orders    Glycosylated Hemoglobin A1c    Chronic Reflux Esophagitis     gerd still requiring daily ranitidine.         Postsurgical Hypothyroidism     Managed by endocrine.  Lab Results   Component Value Date    TSH 1.17 08/07/2017      Taking synthroid 150 mcg daily         Essential hypertension     BP Readings from Last 3 Encounters:   10/05/17 122/76   08/29/17 124/80   07/19/17 126/84     Continue lisinopril 10 mg po q day.   Continue hctz 25 mg po q day.          Hyperlipidemia - Primary    Relevant Orders    Lipid Cascade      Other Visit Diagnoses     Screening for diabetes mellitus               Chief Complaint   Patient presents with     Weight Loss     HPI  Bee Ernst is a 57 y.o. female comes in to address htn with weight reduction, she is here for medically assisted weight reduction having tried to lose in the past and failing attain and maintain healthy weight on her own.    She says logging helps and that insomnia makes it really hard for her.  She has not seen her primary recently but does need a primary MD.      Are you experiencing any side effects to  the medications:  No  Hunger control:  good  Exercise was discussed: no  Taking supplements:  yes  Discussed journaling food:  Yes. Mentions eating grilled chicken with salad for dinner or atkins frozen meals and then alcohol on weekends/ holidays. Likes tropical drinks but tries to substitute 'sparkling water' which she describes as a low carb etoh beverage.  She also says that she has late night skinny pop mini cakes or greek yogurt bars.  Patient is pleased with the current results:  Feels really frustrated right now. And not happy  The patient is following the nutrition plan:  She says meal planning and weight loss resistance are hard for her.   Barriers to losing weight:  Metabolism:   IR      History   Smoking Status     Former Smoker   Smokeless Tobacco     Never Used      Current Outpatient Prescriptions   Medication Sig Dispense Refill     atorvastatin (LIPITOR) 10 MG tablet Take 1 tablet by mouth daily.       calcium carbonate (CALCIUM CARBONATE) 300 mg (750 mg) Chew Chew 2-4 tablets daily.       hydrochlorothiazide (HYDRODIURIL) 25 MG tablet Take 12.5 mg by mouth daily.        lansoprazole (PREVACID) 30 MG capsule Take 30 mg by mouth 2 (two) times a day.        lisinopril (PRINIVIL,ZESTRIL) 10 MG tablet Take 5 mg by mouth daily.       multivitamin (MULTIVITAMIN) per tablet Take 1 tablet by mouth daily.       phentermine (ADIPEX-P) 37.5 mg tablet TAKE 1 TABLET (37.5 MG TOTAL) BY MOUTH DAILY BEFORE BREAKFAST. 30 tablet 0     RANITIDINE HCL (ACID REDUCER, RANITIDINE, ORAL) Take 300 mg by mouth daily.        SYNTHROID 150 mcg tablet TAKE 1 TABLET (150 MCG TOTAL) BY MOUTH DAILY. 90 tablet 1     No current facility-administered medications for this visit.      No Known Allergies  Review of Systems   Constitutional: Negative.    HENT: Negative.    Eyes: Negative.    Respiratory: Negative.    Cardiovascular: Negative.    Gastrointestinal: Negative.    Endocrine: Negative.    Genitourinary: Negative.   "  Musculoskeletal: Negative.    Skin: Negative.    Neurological: Negative.    Hematological: Negative.    Psychiatric/Behavioral: Negative.      OBJECTIVE: Blood pressure 122/76, pulse 74, height 5' 6\" (1.676 m), weight (!) 238 lb 9.6 oz (108.2 kg), last menstrual period 12/14/2007, currently breastfeeding.  Physical Exam   Constitutional: She is oriented to person, place, and time. She appears well-developed and well-nourished.   HENT:   Head: Normocephalic and atraumatic.   Eyes: Conjunctivae are normal.   Cardiovascular: Normal rate and regular rhythm.    Pulmonary/Chest: Effort normal.   Neurological: She is alert and oriented to person, place, and time.   Skin: Skin is warm and dry.   Psychiatric: She has a normal mood and affect.        "

## 2021-06-14 NOTE — PROGRESS NOTES
ASSESSMENT AND PLAN: We spent 25 minutes today in direct patient contact, 100% of the time in consultation concerning medical problems as listed.      SUMMARY  Tr Anderson MD - PCP  Start weight - 245 lbs, 5/17/2017 - 238 11/9/2017   Goal weight: 221 - 233 lbs (5-10% loss) by 2/2018  Dx: htn, gerd  Prescribed meds: phentermine - has uses in past and lost, but gained back, need to wean slowly.  Supplements: fish oil, mvi, vit D, chromium  Goals this month:  start tracking macronutrients.  Reminder labs due in Aug 2017.  Future: trigger foods talked about eating 'little halloween candies'. introducing weaning phentermine if not effective.  Follow up monthly.    Recommended macronutrients;   Calories: 1500 daily  Protein 110 grams per day  carbs 50-75 grams per day     Problem List Items Addressed This Visit     BMI 39.0-39.9,adult    Relevant Orders          Chronic Reflux Esophagitis     gerd still requiring daily ranitidine.         Postsurgical Hypothyroidism     Managed by endocrine.  Lab Results   Component Value Date    TSH 1.17 08/07/2017      Taking synthroid 150 mcg daily         Essential hypertension     BP Readings from Last 3 Encounters:   10/05/17 122/76   08/29/17 124/80   07/19/17 126/84     Continue lisinopril 10 mg po q day.   Continue hctz 25 mg po q day.          Hyperlipidemia - Primary    Relevant Orders    Lipid Cascade      Other Visit Diagnoses     Screening for diabetes mellitus      Glycosylated Hemoglobin A1c           Chief Complaint   Patient presents with     Weight Loss     HPI  Bee Ernst is a 57 y.o. female comes in to address htn with weight reduction, she is here for medically assisted weight reduction having tried to lose in the past and failing attain and maintain healthy weight on her own.    She is very frustrated today.  She feels like even if she eats one wrong thing she does not have good luck with her weight.    Are you experiencing any side effects to the  medications:  No  Hunger control:  good  Exercise was discussed: no  Taking supplements:  yes  Discussed journaling food:  Yes. Mentions eating grilled chicken with salad for dinner or atkins frozen meals and then alcohol on weekends/ holidays. Likes tropical drinks but tries to substitute 'sparkling water' which she describes as a low carb etoh beverage.  She also says that she has late night skinny pop mini cakes or greek yogurt bars.  Patient is pleased with the current results:  Feels really frustrated right now. And not happy  The patient is following the nutrition plan:  She says meal planning and weight loss resistance are hard for her.   Barriers to losing weight:  Metabolism:   IR      History   Smoking Status     Former Smoker   Smokeless Tobacco     Never Used      Current Outpatient Prescriptions   Medication Sig Dispense Refill     atorvastatin (LIPITOR) 10 MG tablet Take 1 tablet by mouth daily.       calcium carbonate (CALCIUM CARBONATE) 300 mg (750 mg) Chew Chew 2-4 tablets daily.       hydrochlorothiazide (HYDRODIURIL) 25 MG tablet Take 12.5 mg by mouth daily.        lansoprazole (PREVACID) 30 MG capsule Take 30 mg by mouth 2 (two) times a day.        lisinopril (PRINIVIL,ZESTRIL) 10 MG tablet Take 5 mg by mouth daily.       multivitamin (MULTIVITAMIN) per tablet Take 1 tablet by mouth daily.       phentermine (ADIPEX-P) 37.5 mg tablet Take one tablet (37.5 Mg) by mouth daily before breakfast 30 tablet 0     RANITIDINE HCL (ACID REDUCER, RANITIDINE, ORAL) Take 300 mg by mouth daily.        SYNTHROID 150 mcg tablet TAKE 1 TABLET (150 MCG TOTAL) BY MOUTH DAILY. 90 tablet 1     No current facility-administered medications for this visit.      No Known Allergies  Review of Systems   Constitutional: Negative.    HENT: Negative.    Eyes: Negative.    Respiratory: Negative.    Cardiovascular: Negative.    Gastrointestinal: Negative.    Endocrine: Negative.    Genitourinary: Negative.    Musculoskeletal:  "Negative.    Skin: Negative.    Neurological: Negative.    Hematological: Negative.    Psychiatric/Behavioral: Negative.      OBJECTIVE: Blood pressure 124/74, pulse 68, height 5' 6\" (1.676 m), weight (!) 238 lb 12.8 oz (108.3 kg), last menstrual period 12/14/2007, not currently breastfeeding.  Physical Exam   Constitutional: She is oriented to person, place, and time. She appears well-developed and well-nourished.   HENT:   Head: Normocephalic and atraumatic.   Eyes: Conjunctivae are normal.   Cardiovascular: Normal rate and regular rhythm.    Pulmonary/Chest: Effort normal.   Neurological: She is alert and oriented to person, place, and time.   Skin: Skin is warm and dry.   Psychiatric: She has a normal mood and affect.        Initial Weight: 245.6 lbs  Weight: 238 lb 12.8 oz (108.3 kg)  Weight loss from initial: 6.8  % Weight loss: 2.77 %    "

## 2021-06-16 PROBLEM — I73.00 RAYNAUD'S DISEASE: Status: ACTIVE | Noted: 2020-06-05

## 2021-06-16 PROBLEM — E03.9 HYPOTHYROIDISM: Status: ACTIVE | Noted: 2020-06-05

## 2021-06-16 PROBLEM — E66.01 MORBID OBESITY (H): Status: ACTIVE | Noted: 2018-06-29

## 2021-06-16 PROBLEM — M54.30 SCIATICA: Status: ACTIVE | Noted: 2020-06-05

## 2021-06-16 PROBLEM — R76.8 OTHER SPECIFIED ABNORMAL IMMUNOLOGICAL FINDINGS IN SERUM: Status: ACTIVE | Noted: 2020-06-05

## 2021-06-16 PROBLEM — E78.5 HYPERLIPIDEMIA: Status: ACTIVE | Noted: 2017-09-10

## 2021-06-16 PROBLEM — R73.03 PREDIABETES: Status: ACTIVE | Noted: 2017-11-09

## 2021-06-16 PROBLEM — K21.9 GASTROESOPHAGEAL REFLUX DISEASE: Status: ACTIVE | Noted: 2020-06-05

## 2021-06-16 PROBLEM — Z85.850 HISTORY OF MALIGNANT NEOPLASM OF THYROID: Status: ACTIVE | Noted: 2020-06-05

## 2021-06-16 PROBLEM — M19.90 INFLAMMATORY ARTHRITIS: Status: ACTIVE | Noted: 2020-06-05

## 2021-06-17 NOTE — PATIENT INSTRUCTIONS - HE
Patient Instructions by Stone Lee PA-C at 5/23/2019 10:50 AM     Author: Stone Lee PA-C Service: -- Author Type: Physician Assistant    Filed: 5/23/2019 11:55 AM Encounter Date: 5/23/2019 Status: Signed    : Stone Lee PA-C (Physician Assistant)       Patient Education     Understanding Meniscal Tears    The meniscus is a tough cushion of fibrous tissue called cartilage in the knee joint. It cushions the knee. It absorbs shock and helps spread weight across the knee joint. It also works with other parts of the knee to help keep the joint stable. Injury or aging can cause the meniscus to tear and lead to pain and problems using the knee.   What causes meniscal tears?  A sudden injury can tear the meniscus. This is often because of planting the foot and twisting the knee. Sports such as soccer, football, and basketball are often involved. Repeated actions, such as squatting, may also lead to a tear. Breakdown of the meniscus because of aging can also lead to tears.  Symptoms of meniscal tears  These can include:    Knee pain    Knee swelling    Catching of the knee or inability to straighten the knee    Unstable feeling in the knee  Treatment for meniscal tears  A tear is unlikely to heal on its own. You often will need surgery to repair a tear. In many cases, your healthcare provider will first try treatments to help relieve symptoms. These may include:    Rest the knee. This means avoiding any activity that puts stress on the knee joint. These include kneeling, squatting, jogging, and climbing stairs. In some cases, you may need to use crutches for a time to keep body weight off of the knee joint.    Cold pack. Putting a cold pack on the knee helps reduce pain and swelling.    Knee brace. Bracing the knee helps support it.    Medicine. Prescription and over-the-counter pain medicines can help relieve swelling and pain.    Exercises. Exercises help strengthen the muscles of the leg  to help support the knee joint.  If these treatments dont help relieve symptoms or the injury is severe, you may need surgery. This can repair the meniscus to relieve symptoms and restore movement.     When to call your healthcare provider  Call your healthcare provider right away if you have any of these:    Fever of 100.4 F (38 C) or higher, or as directed    Pain or swelling that gets worse, including pain in the calf    Numbness or tingling in leg or foot    You suddenly cant put any weight on your leg    Your knee locks   Date Last Reviewed: 3/10/2016    5687-8438 [employer]. [address] [place] [address]. All rights reserved. This information is not intended as a substitute for professional medical care. Always follow your healthcare professional's instructions.

## 2021-06-18 ENCOUNTER — AMBULATORY - HEALTHEAST (OUTPATIENT)
Dept: LAB | Facility: CLINIC | Age: 61
End: 2021-06-18

## 2021-06-18 DIAGNOSIS — C73 MALIGNANT NEOPLASM OF THYROID GLAND (H): ICD-10-CM

## 2021-06-18 LAB
T4 FREE SERPL-MCNC: 1.2 NG/DL (ref 0.7–1.8)
TSH SERPL DL<=0.005 MIU/L-ACNC: 1.12 UIU/ML (ref 0.3–5)

## 2021-06-19 NOTE — PROGRESS NOTES
[facility]  ENDOCRINOLOGY    Thyroid Note  7/3/2018    Bee Ernst, 1960, [id number]          Reason for visit      1. Postsurgical hypothyroidism    2. Morbid obesity (H)    3. Papillary Carcinoma Of The Thyroid Gland        HPI     Bee Ernst is a very pleasant 58 y.o. old female who presents for follow up.  SUMMARY:  1. Papillary thyroid carcinoma. status post resection by Dr. Baldwin in 02/2008.    Her thyroid cancer was discovered incidentally on a CT of the chest. She had a 2.6    x 2.6 x 1.4 cm papillary carcinoma in the left lobe, which was unifocal. No    lymph nodes were submitted for evaluation. Postoperative thyroid hormone    withdrawal total body scan revealed uptake in the neck of 3.9% at 6 hours and    5.9% at 24 hours. Her TSH at that time was 56 with a concurrent    thyroglobulin of 7.4. She received 70 mCi of radioactive iodine for ablative    purposes. Post-therapy scan showed uptake in her thyroid bed and salivary    glands only. No evidence of metastatic disease.    The patient completed a thyrogen-stimulated total body scan in 2009. With    thyrogen stimulation, her TSH was 87 and thyroglobulin was undetectable. Her    scan revealed no abnormal uptake in the neck or elsewhere.    Neck US 9/2010 and March 2011 showed stable enlarged LN in the neck but no suspicious lesions on the thyroid bed or elsewhere. FNA of left neck LN was negative in 9/2008.    Most recent neck US in Jan 2012 was normal.    12/2012 US: 8mm lesion in L thyroid bed and enlarging level 3 LN in left neck. FNA was benign.  4/10/2014: Ultrasound no evidence of recurrent or metastatic disease in the neck. 9 mm benign-appearing superior left internal jugular chain lymph node  2. Postsurgical hypothyroidism-she is now8  years post thyroidectomy and can be gradually transitioned to replacement doses  3. Obesity-she has trouble with weight loss for many years    TODAY:  Bee is here today as a AMA from Dr Santoyo, whom she last  "saw, in April of 2016. She has been taking Levothyroxine 150 mcg daily on an empty stomach. Current TSH is 1.08 and Free T 4 is 1.2.  Her Tumor marker is <0.1.   She reports that she has regained all of the weight that she had lost while on Phentermine.  She is looking into the Keto diet, as well as Gluten Free.  She notes that her blood sugars go up when she eats higher CHO foods.     Past Medical History     Patient Active Problem List   Diagnosis     BMI 39.0-39.9,adult     Chronic Reflux Esophagitis     Changed Sexual Interest (Libido): Decreased     Papillary Carcinoma Of The Thyroid Gland     Postsurgical Hypothyroidism     Skin Tag (___ Mm)     Essential hypertension     Hyperlipidemia     Prediabetes     Morbid obesity (H)       Family History       family history includes Breast cancer (age of onset: 62) in her mother.    Social History      reports that she has quit smoking. She has never used smokeless tobacco. She reports that she drinks alcohol. She reports that she does not use illicit drugs.      Review of Systems     Patient denies fatigue, weight changes, heat/cold intolerance, bowel/skin changes or CVS symptoms.   Remainder per HPI and per attached intake form.      Vital Signs     /72  Ht 5' 6\" (1.676 m)  Wt (!) 260 lb 11.2 oz (118.3 kg)  LMP 12/14/2007  BMI 42.08 kg/m2  Wt Readings from Last 3 Encounters:   06/29/18 (!) 260 lb 11.2 oz (118.3 kg)   11/09/17 (!) 238 lb 12.8 oz (108.3 kg)   10/05/17 (!) 238 lb 9.6 oz (108.2 kg)       Physical Exam     General:  Normal, NIRD,appears euthyroid  Eyes:  Pupils equal, round and reactive to light; no proptosis, lid lag or  periorbital edema.  Thyroid:  Thyroid is normal.  No tenderness or bruit  Neck: No lymph nodes  Musculoskeletal:  Muscle strength grossly normal without evidence of wasting.  Heart:  Regular rate and rhythm without murmur.  Lungs:  Clear to auscultation.  Abdomen: Soft, non-tender, no masses or organomegaly  Neuro: Patella " Reflexes were normal.No tremors  Skin:  No acanthosis nigricans or vitiligo        Assessment     1. Postsurgical hypothyroidism    2. Morbid obesity (H)    3. Papillary Carcinoma Of The Thyroid Gland            Plan     Pt is interested in restarting Phentermine, which I feel is reasonable, as she has a hx of success on the medication.  Also encouraged to either go Gluten Free, or to start on the Keto diet, with her daughter, who has lost 40 lbs on it.    She is bio-chemically and physically euthyroid.  She will remain on Levothyroxine 150 mcg daily.  Refills provided.  She will f/u with me in 1 year.    Time spent with pt today: 25 min with >50% spent in counseling and coordination of care.          [facility]  7/3/2018  2:26 PM      Lab Results     TSH   Date Value Ref Range Status   06/21/2018 1.08 0.30 - 5.00 uIU/mL Final     T3, Total   Date Value Ref Range Status   01/12/2012 83 45 - 175 ng/dL Final     No results found for: THYROIDAB    No results found for: W3HVGOT    Imaging Results   Last thyroid ultrasound:  Results for orders placed during the hospital encounter of 04/06/16   US Thyroid    Narrative US THYROID  4/6/2016 5:26 PM    INDICATION: Malignant neoplasm of thyroid gland  TECHNIQUE: Routine.  COMPARISON: 4/10/2014.    FINDINGS:  The thyroid is surgically absent. No recurrent or residual thyroid tissue or masses. There is a mildly enlarged left cervical lymph node in the jugulodigastric chain at the level of thyroid cartilage measuring 1.7 x 1 x 1 cm. It appears diffusely   hypoechoic with loss of fatty hilum. Additional small cervical nodes with normal marrow morphology are noted bilaterally.      Impression CONCLUSION:  1.  Status post thyroidectomy. No residual or recurrent mass in the thyroid bed.  2.  1. Borderline enlarged left cervical lymph node which shows loss of the normal morphology. I cannot exclude early lymph node metastasis.    NOTE: ABNORMAL REPORT    THE  DICTATION ABOVE DESCRIBES AN ABNORMALITY FOR WHICH FOLLOW-UP IS NEEDED.        Last thyroid nuclear scan:  No results found for this or any previous visit.    Current Medications     Outpatient Medications Prior to Visit   Medication Sig Dispense Refill     atorvastatin (LIPITOR) 10 MG tablet Take 1 tablet by mouth daily.       hydrochlorothiazide (HYDRODIURIL) 25 MG tablet Take 12.5 mg by mouth daily.        lansoprazole (PREVACID) 30 MG capsule Take 30 mg by mouth 2 (two) times a day.        lisinopril (PRINIVIL,ZESTRIL) 10 MG tablet Take 5 mg by mouth daily.       multivitamin (MULTIVITAMIN) per tablet Take 1 tablet by mouth daily.       RANITIDINE HCL (ACID REDUCER, RANITIDINE, ORAL) Take 300 mg by mouth daily.        SYNTHROID 150 mcg tablet TAKE 1 TABLET (150 MCG TOTAL) BY MOUTH DAILY. 90 tablet 1     calcium carbonate (CALCIUM CARBONATE) 300 mg (750 mg) Chew Chew 2-4 tablets daily.       phentermine (ADIPEX-P) 37.5 mg tablet Take one tablet (37.5 Mg) by mouth daily before breakfast 30 tablet 0     No facility-administered medications prior to visit.

## 2021-06-25 NOTE — TELEPHONE ENCOUNTER
Date: 6/29/2021 Status: [facility]   Time: 10:20 AM Length: 20   Visit Type: VIDEO VISIT RETURN [1702] Copay: $0.00   Provider: Christine Harry NP           Labs scheduled for 6/18. Please place lab orders and bridge meds until seen.

## 2021-06-26 ENCOUNTER — HEALTH MAINTENANCE LETTER (OUTPATIENT)
Age: 61
End: 2021-06-26

## 2021-06-27 NOTE — PROGRESS NOTES
Progress Notes by Stone Lee PA-C at 5/23/2019 10:50 AM     Author: Stone Lee PA-C Service: -- Author Type: Physician Assistant    Filed: 5/23/2019 12:54 PM Encounter Date: 5/23/2019 Status: Signed    : Stone Lee PA-C (Physician Assistant)       Assessment:       Left knee pain    Medical Decision Making  Patient's left knee pain appears most consistent with mild meniscal tear injury with intermittent pains exacerbated by twisting motions and feeling of an instable knee. Symptoms appear mild as there is no focal swelling or tenderness, and I am unable to recreate pain with flexion/extension of the knee under external and internal rotation of the leg. Knee x-ray showed no signs of osteoarthritis or dislocation. Discussed course of treatment in length. Patient decided to go with conservative management at this time with a knee brace, icing, leg stretches/exercises, and rest. She is going to call her primary care providers office to see if she can get a handicap parking permit to avoid the incline causing the increased twisting motion. If symptoms worsen or persist for 1 week, patient is to follow-up with orthopedics at that time.      Plan:       Knee brace.  Ice.  Rest.  Light stretching / leg raise exercises.  OTC analgesics discussed.  Discussed signs of worsening symptoms and when to follow-up with PCP if no symptom improvement.    Patient Instructions     Patient Education     Understanding Meniscal Tears    The meniscus is a tough cushion of fibrous tissue called cartilage in the knee joint. It cushions the knee. It absorbs shock and helps spread weight across the knee joint. It also works with other parts of the knee to help keep the joint stable. Injury or aging can cause the meniscus to tear and lead to pain and problems using the knee.   What causes meniscal tears?  A sudden injury can tear the meniscus. This is often because of planting the foot and twisting the knee.  Sports such as soccer, football, and basketball are often involved. Repeated actions, such as squatting, may also lead to a tear. Breakdown of the meniscus because of aging can also lead to tears.  Symptoms of meniscal tears  These can include:    Knee pain    Knee swelling    Catching of the knee or inability to straighten the knee    Unstable feeling in the knee  Treatment for meniscal tears  A tear is unlikely to heal on its own. You often will need surgery to repair a tear. In many cases, your healthcare provider will first try treatments to help relieve symptoms. These may include:    Rest the knee. This means avoiding any activity that puts stress on the knee joint. These include kneeling, squatting, jogging, and climbing stairs. In some cases, you may need to use crutches for a time to keep body weight off of the knee joint.    Cold pack. Putting a cold pack on the knee helps reduce pain and swelling.    Knee brace. Bracing the knee helps support it.    Medicine. Prescription and over-the-counter pain medicines can help relieve swelling and pain.    Exercises. Exercises help strengthen the muscles of the leg to help support the knee joint.  If these treatments dont help relieve symptoms or the injury is severe, you may need surgery. This can repair the meniscus to relieve symptoms and restore movement.     When to call your healthcare provider  Call your healthcare provider right away if you have any of these:    Fever of 100.4 F (38 C) or higher, or as directed    Pain or swelling that gets worse, including pain in the calf    Numbness or tingling in leg or foot    You suddenly cant put any weight on your leg    Your knee locks   Date Last Reviewed: 3/10/2016    6610-5158 [employer]. [address] [place] [address], [place] [address]. All rights reserved. This information is not intended as a substitute for professional medical care. Always follow your healthcare professional's instructions.        "          Subjective:       Bee Ernst is a 59 y.o. female who presents with left knee pain. Onset was gradual and symptoms come-and-go. Inciting event: no known traumatic event. Patient notes that she has recently been assigned a new parking spot at work that sits on an angle. So as she step out of her vehicle she has to twist he left leg. Current symptoms include: intermittent pains that feel \"deep\" and affects both sides of the knee cap and the back of the knee while present. Pain is aggravated by a twisting motion of the leg. Sh is most concerned about her knee because she feels like her knee is going to give out. Patient has had a longer history of knee popping, but it has not worsened with her acute pains. Patient has had no prior knee problems. Evaluation to date: none. Treatment to date: none. Patient is wondering if she could get a handicap tag to get her parking spot switched. She has tried to address this issue with her work office, and they are unwilling to move her spot unless she has a handicap parking permit.    The following portions of the patient's history were reviewed and updated as appropriate: allergies, current medications and problem list.     Review of Systems  Pertinent items are noted in HPI.    Allergies  No Known Allergies      Objective:       /84 (Patient Site: Right Arm, Patient Position: Sitting, Cuff Size: Adult Large)   Pulse 73   Temp 97.6  F (36.4  C)   Resp 16   Wt (!) 262 lb (118.8 kg)   LMP 12/14/2007   SpO2 98%   Breastfeeding? No   BMI 42.29 kg/m    General appearance: alert, appears stated age, cooperative, no distress and non-toxic  Extremities: left knee: no obvious trauma or deformity; no cyanosis or edema; FROM of knee without pain; no joint laxity; no knee popping or clicking during flexion and extension while leg was externally and internally rotated  Pulses: 2+ and symmetric  Skin: no obvious swelling over the left knee, no erythema or ecchymosis. No " "increased warmth to touch. Mild tenderness to palpation of posterior left knee.    Imaging    Xr Knee Left 1 Or 2 Vws    Result Date: 5/23/2019  EXAM: XR KNEE LEFT 1 OR 2 VWS LOCATION: [facility] DATE/TIME: 5/23/2019 11:19 AM INDICATION: Patient experiencing intermittend deep knee pain and feeling like the knee is \"giving out\"; rule out osteoarthritis COMPARISON: None. FINDINGS: Negative knee. No fracture or dislocation. No joint effusion. No arthropathy seen.    I personally reviewed the results, which showed no signs of acute fracture, dislocation, or osteoarthritis. Discussed findings with the patient.                  "

## 2021-07-04 NOTE — ADDENDUM NOTE
Addendum Note by Patricia Davidson CMA at 6/7/2021 10:33 AM     Author: Patricia Davidson CMA Service: -- Author Type: Certified Medical Assistant    Filed: 6/7/2021 10:33 AM Encounter Date: 5/30/2021 Status: Signed    : Patricia Davidson CMA (Certified Medical Assistant)    Addended by: PATRICIA DAVIDSON on: 6/7/2021 10:33 AM        Modules accepted: Orders

## 2021-07-10 ENCOUNTER — COMMUNICATION - HEALTHEAST (OUTPATIENT)
Dept: ENDOCRINOLOGY | Facility: CLINIC | Age: 61
End: 2021-07-10

## 2021-07-10 DIAGNOSIS — C73 MALIGNANT NEOPLASM OF THYROID GLAND (H): ICD-10-CM

## 2021-07-13 ENCOUNTER — RECORDS - HEALTHEAST (OUTPATIENT)
Dept: ADMINISTRATIVE | Facility: CLINIC | Age: 61
End: 2021-07-13

## 2021-07-15 RX ORDER — LEVOTHYROXINE SODIUM 150 UG/1
TABLET ORAL
Qty: 35 TABLET | Refills: 1 | OUTPATIENT
Start: 2021-07-15

## 2021-07-21 ENCOUNTER — RECORDS - HEALTHEAST (OUTPATIENT)
Dept: ADMINISTRATIVE | Facility: CLINIC | Age: 61
End: 2021-07-21

## 2021-10-16 ENCOUNTER — HEALTH MAINTENANCE LETTER (OUTPATIENT)
Age: 61
End: 2021-10-16

## 2021-12-01 ENCOUNTER — ANCILLARY PROCEDURE (OUTPATIENT)
Dept: MAMMOGRAPHY | Facility: CLINIC | Age: 61
End: 2021-12-01
Attending: FAMILY MEDICINE
Payer: COMMERCIAL

## 2021-12-01 DIAGNOSIS — Z12.31 VISIT FOR SCREENING MAMMOGRAM: ICD-10-CM

## 2021-12-01 PROCEDURE — 77067 SCR MAMMO BI INCL CAD: CPT | Mod: TC | Performed by: RADIOLOGY

## 2021-12-01 PROCEDURE — 77063 BREAST TOMOSYNTHESIS BI: CPT | Mod: TC | Performed by: RADIOLOGY

## 2021-12-21 ENCOUNTER — LAB REQUISITION (OUTPATIENT)
Dept: LAB | Facility: CLINIC | Age: 61
End: 2021-12-21

## 2021-12-21 DIAGNOSIS — Z00.00 ENCOUNTER FOR GENERAL ADULT MEDICAL EXAMINATION WITHOUT ABNORMAL FINDINGS: ICD-10-CM

## 2021-12-21 PROCEDURE — 80061 LIPID PANEL: CPT | Performed by: FAMILY MEDICINE

## 2021-12-21 PROCEDURE — 82040 ASSAY OF SERUM ALBUMIN: CPT | Performed by: FAMILY MEDICINE

## 2021-12-22 LAB
ALBUMIN SERPL-MCNC: 4.3 G/DL (ref 3.5–5)
ALP SERPL-CCNC: 59 U/L (ref 45–120)
ALT SERPL W P-5'-P-CCNC: 20 U/L (ref 0–45)
ANION GAP SERPL CALCULATED.3IONS-SCNC: 12 MMOL/L (ref 5–18)
AST SERPL W P-5'-P-CCNC: 15 U/L (ref 0–40)
BILIRUB SERPL-MCNC: 0.4 MG/DL (ref 0–1)
BUN SERPL-MCNC: 15 MG/DL (ref 8–22)
CALCIUM SERPL-MCNC: 9.5 MG/DL (ref 8.5–10.5)
CHLORIDE BLD-SCNC: 105 MMOL/L (ref 98–107)
CHOLEST SERPL-MCNC: 147 MG/DL
CO2 SERPL-SCNC: 27 MMOL/L (ref 22–31)
CREAT SERPL-MCNC: 0.8 MG/DL (ref 0.6–1.1)
GFR SERPL CREATININE-BSD FRML MDRD: 83 ML/MIN/1.73M2
GLUCOSE BLD-MCNC: 86 MG/DL (ref 70–125)
HDLC SERPL-MCNC: 50 MG/DL
LDLC SERPL CALC-MCNC: 61 MG/DL
POTASSIUM BLD-SCNC: 4.5 MMOL/L (ref 3.5–5)
PROT SERPL-MCNC: 7.2 G/DL (ref 6–8)
SODIUM SERPL-SCNC: 144 MMOL/L (ref 136–145)
TRIGL SERPL-MCNC: 182 MG/DL

## 2022-05-02 DIAGNOSIS — C73 MALIGNANT NEOPLASM OF THYROID GLAND (H): ICD-10-CM

## 2022-05-05 ENCOUNTER — TELEPHONE (OUTPATIENT)
Dept: ENDOCRINOLOGY | Facility: CLINIC | Age: 62
End: 2022-05-05
Payer: COMMERCIAL

## 2022-05-05 NOTE — TELEPHONE ENCOUNTER
[facility] Call Center    Phone Message    May a detailed message be left on voicemail: yes     Reason for Call: Medication Refill Request    Has the patient contacted the pharmacy for the refill? Yes   Name of medication being requested: Levothyroxine  Provider who prescribed the medication: Christine Harry  Pharmacy: [facility] on [phone]  Date medication is needed: As soon as possible     Pt of Christine's calling in stating the pharmacy sent her request to a Dr. Li instead of Christine.  Pt is asking for a refill and would like a call once this has been done      Action Taken: Message routed to:  [facility] Endo    Travel Screening: Not Applicable

## 2022-05-05 NOTE — TELEPHONE ENCOUNTER
"Routing refill request to provider for review/approval because:  Does not appear to have ever been prescribed by Dr. Li. Forwarding to PCP.     Requested Prescriptions   Pending Prescriptions Disp Refills    levothyroxine (SYNTHROID/LEVOTHROID) 150 MCG tablet [Pharmacy Med Name: LEVOTHYROXINE 0.150MG (150MCG) TAB] 94 tablet 3     Sig: TAKE 1 TABLET BY MOUTH DAILY FOR 6 DAYS, THEN 2 TABLETS ON THE 7TH DAY        Thyroid Protocol Failed - 5/2/2022  3:42 AM        Failed - Recent (12 mo) or future (30 days) visit within the authorizing provider's specialty     Patient has had an office visit with the authorizing provider or a provider within the authorizing providers department within the previous 12 mos or has a future within next 30 days. See \"Patient Info\" tab in inbasket, or \"Choose Columns\" in Meds & Orders section of the refill encounter.              Passed - Patient is 12 years or older        Passed - Medication is active on med list        Passed - Normal TSH on file in past 12 months     Recent Labs   Lab Test 06/18/21  1130   TSH 1.12                Passed - No active pregnancy on record     If patient is pregnant or has had a positive pregnancy test, please check TSH.          Passed - No positive pregnancy test in past 12 months     If patient is pregnant or has had a positive pregnancy test, please check TSH.                Amberly López RN   [facility]     "

## 2022-05-06 RX ORDER — LEVOTHYROXINE SODIUM 150 UG/1
TABLET ORAL
Qty: 94 TABLET | Refills: 0 | Status: SHIPPED | OUTPATIENT
Start: 2022-05-06 | End: 2022-06-29

## 2022-06-21 ENCOUNTER — LAB (OUTPATIENT)
Dept: LAB | Facility: CLINIC | Age: 62
End: 2022-06-21
Payer: COMMERCIAL

## 2022-06-21 DIAGNOSIS — C73 MALIGNANT NEOPLASM OF THYROID GLAND (H): ICD-10-CM

## 2022-06-21 LAB
ANION GAP SERPL CALCULATED.3IONS-SCNC: 9 MMOL/L (ref 5–18)
BUN SERPL-MCNC: 16 MG/DL (ref 8–22)
CALCIUM SERPL-MCNC: 9.3 MG/DL (ref 8.5–10.5)
CHLORIDE BLD-SCNC: 106 MMOL/L (ref 98–107)
CO2 SERPL-SCNC: 26 MMOL/L (ref 22–31)
CREAT SERPL-MCNC: 0.73 MG/DL (ref 0.6–1.1)
DEPRECATED CALCIDIOL+CALCIFEROL SERPL-MC: 60 UG/L (ref 20–75)
GFR SERPL CREATININE-BSD FRML MDRD: >90 ML/MIN/1.73M2
GLUCOSE BLD-MCNC: 88 MG/DL (ref 70–125)
POTASSIUM BLD-SCNC: 4.6 MMOL/L (ref 3.5–5)
SODIUM SERPL-SCNC: 141 MMOL/L (ref 136–145)
T4 FREE SERPL-MCNC: 1.33 NG/DL (ref 0.7–1.8)
TSH SERPL DL<=0.005 MIU/L-ACNC: 0.25 UIU/ML (ref 0.3–5)

## 2022-06-21 PROCEDURE — 82306 VITAMIN D 25 HYDROXY: CPT

## 2022-06-21 PROCEDURE — 84443 ASSAY THYROID STIM HORMONE: CPT

## 2022-06-21 PROCEDURE — 80048 BASIC METABOLIC PNL TOTAL CA: CPT

## 2022-06-21 PROCEDURE — 84439 ASSAY OF FREE THYROXINE: CPT

## 2022-06-21 PROCEDURE — 36415 COLL VENOUS BLD VENIPUNCTURE: CPT

## 2022-06-23 ASSESSMENT — ENCOUNTER SYMPTOMS
NAUSEA: 0
DECREASED LIBIDO: 1
DIARRHEA: 1
RECTAL PAIN: 0
HEARTBURN: 0
HOT FLASHES: 0
MUSCLE CRAMPS: 0
MYALGIAS: 1
JAUNDICE: 0
ARTHRALGIAS: 1
MUSCLE WEAKNESS: 1
JOINT SWELLING: 0
NECK PAIN: 0
BOWEL INCONTINENCE: 0
BACK PAIN: 0
BLOATING: 0
BLOOD IN STOOL: 0
ABDOMINAL PAIN: 0
VOMITING: 0
STIFFNESS: 1
CONSTIPATION: 0

## 2022-06-29 ENCOUNTER — VIRTUAL VISIT (OUTPATIENT)
Dept: ENDOCRINOLOGY | Facility: CLINIC | Age: 62
End: 2022-06-29
Payer: COMMERCIAL

## 2022-06-29 DIAGNOSIS — E55.9 VITAMIN D DEFICIENCY: ICD-10-CM

## 2022-06-29 DIAGNOSIS — E89.0 POSTSURGICAL HYPOTHYROIDISM: Primary | ICD-10-CM

## 2022-06-29 DIAGNOSIS — E66.01 MORBID OBESITY (H): ICD-10-CM

## 2022-06-29 PROBLEM — M79.7 FIBROMYALGIA: Status: ACTIVE | Noted: 2020-09-18

## 2022-06-29 PROBLEM — M79.643 PAIN OF HAND: Status: ACTIVE | Noted: 2022-06-29

## 2022-06-29 PROBLEM — K63.5 POLYP OF COLON: Status: ACTIVE | Noted: 2020-12-31

## 2022-06-29 PROBLEM — G47.00 INSOMNIA: Status: ACTIVE | Noted: 2022-06-29

## 2022-06-29 PROBLEM — R76.8 ELEVATED ANTINUCLEAR ANTIBODY (ANA) LEVEL: Status: ACTIVE | Noted: 2022-06-29

## 2022-06-29 PROBLEM — D12.2 BENIGN NEOPLASM OF ASCENDING COLON: Status: ACTIVE | Noted: 2021-01-05

## 2022-06-29 PROBLEM — M32.9 SYSTEMIC LUPUS ERYTHEMATOSUS (H): Status: ACTIVE | Noted: 2022-06-29

## 2022-06-29 PROBLEM — R10.13 EPIGASTRIC BURNING SENSATION: Status: ACTIVE | Noted: 2019-05-08

## 2022-06-29 PROBLEM — K44.9 DIAPHRAGMATIC HERNIA: Status: ACTIVE | Noted: 2019-06-06

## 2022-06-29 PROCEDURE — 99214 OFFICE O/P EST MOD 30 MIN: CPT | Mod: 95 | Performed by: NURSE PRACTITIONER

## 2022-06-29 RX ORDER — LEVOTHYROXINE SODIUM 150 UG/1
TABLET ORAL
Qty: 112 TABLET | Refills: 3 | Status: SHIPPED | OUTPATIENT
Start: 2022-06-29 | End: 2023-06-27

## 2022-06-29 NOTE — PROGRESS NOTES
"Bee is a 62 year old who is being evaluated via a billable video visit.      How would you like to obtain your AVS? MyChart  If the video visit is dropped, the invitation should be resent by: Text to cell phone: [phone]  Will anyone else be joining your video visit? [facility] ENDOCRINOLOGY    Thyroid Note  6/30/2022    Bee Ernst, 1960, [id number]          Reason for visit      1. Postsurgical hypothyroidism    2. Vitamin D deficiency    3. Morbid obesity (H)        HPI     Bee Ernst is a very pleasant 62 year old old female who presents for follow up.  SUMMARY:    Bee is contacted today via Video visit in f/u for Postoperative Hypothyroidism. She reports that \"all things considered\" she is feeling well. She has multiple autoimmune disorders that often cause her mischief. She feels that her energy level is currently sufficient. Her current TSH is 0.25 and fT4 is 1.33.  She is having no problems referable to her neck at the present time. She is taking Levothyroxine, 150 mcg daily. She reports that she has been much more conscientious about taking it on an empty stomach and this may be why she has been absorbing more that she has traditionally.     Vit D level is currently good at 60.    Pt has been struggling with weight loss. She eats a very clean diet with little CHO. She doesn't do a lot of regular movement. She is interested in learning about some non-surgical weight loss information.     Past Medical History     Patient Active Problem List   Diagnosis     BMI 39.0-39.9,adult     Chronic Reflux Esophagitis     Changed Sexual Interest (Libido): Decreased     Papillary Carcinoma Of The Thyroid Gland     Postsurgical Hypothyroidism     Skin Tag (___ Mm)     Essential hypertension     Hyperlipidemia     Prediabetes     Morbid obesity (H)     Gastroesophageal reflux disease     History of malignant neoplasm of thyroid     Hypothyroidism     Inflammatory arthritis     Other specified " abnormal immunological findings in serum     Raynaud's disease     Sciatica     Benign neoplasm of ascending colon     Diaphragmatic hernia     Elevated antinuclear antibody (MARLEE) level     Fibromyalgia     Epigastric burning sensation     Insomnia     Pain of hand     Polyp of colon     Systemic lupus erythematosus (H)       Family History       family history includes Breast Cancer (age of onset: 62.00) in her mother.    Social History      reports that she quit smoking about 13 years ago. She has never used smokeless tobacco. She reports current alcohol use. She reports that she does not use drugs.      Review of Systems     Patient denies fatigue, weight changes, heat/cold intolerance, bowel/skin changes or CVS symptoms.   Remainder per HPI and per attached intake form.    Answers for HPI/ROS submitted by the patient on 6/23/2022  General Symptoms: No  Skin Symptoms: No  HENT Symptoms: No  EYE SYMPTOMS: No  HEART SYMPTOMS: No  LUNG SYMPTOMS: No  INTESTINAL SYMPTOMS: Yes  URINARY SYMPTOMS: No  GYNECOLOGIC SYMPTOMS: Yes  BREAST SYMPTOMS: No  SKELETAL SYMPTOMS: Yes  BLOOD SYMPTOMS: No  NERVOUS SYSTEM SYMPTOMS: No  MENTAL HEALTH SYMPTOMS: No  Heart burn or indigestion: No  Nausea: No  Vomiting: No  Abdominal pain: No  Bloating: No  Constipation: No  Diarrhea: Yes  Blood in stool: No  Black stools: No  Rectal or Anal pain: No  Fecal incontinence: No  Yellowing of skin or eyes: No  Vomit with blood: No  Change in stools: No  Bleeding or spotting between periods: No  Heavy or painful periods: No  Irregular periods: No  Vaginal discharge: No  Hot flashes: No  Vaginal dryness: Yes  Genital ulcers: No  Reduced libido: Yes  Painful intercourse: No  Difficulty with sexual arousal: Yes  Post-menopausal bleeding: No  Back pain: No  Muscle aches: Yes  Neck pain: No  Swollen joints: No  Joint pain: Yes  Bone pain: No  Muscle cramps: No  Muscle weakness: Yes  Joint stiffness: Yes  Bone fracture: No          Vital Signs     There  were no vitals taken for this visit.  Wt Readings from Last 3 Encounters:   05/23/19 119.1 kg (262 lb 9.6 oz)   05/23/19 118.8 kg (262 lb)   06/29/18 118.3 kg (260 lb 11.2 oz)       Physical Exam   Constitutional:  Well developed, Well nourished  HENT:  Normocephalic,   Neck: normal in appearance  Eyes:  PERRL, Conjunctiva pink  Respiratory:  No respiratory distress  Skin: No acanthosis nigricans, lipoatrophy or lipodystrophy  Neurologic:  Alert & oriented x 3, nonfocal  Psychiatric:  Affect, Mood, Insight appropriate          Assessment     1. Postsurgical hypothyroidism    2. Vitamin D deficiency    3. Morbid obesity (H)            Plan     Pt is bio-chemically and physically euthyroid. She will continue with her current medication regimen and will f/u with me in 1 year.    Continue on current Vit D supplementation.    I have put in a referral with the Bariatric Department for help with weight loss.        Christine Harry NP   Endocrinology  6/30/2022  6:10 AM        Lab Results     TSH   Date Value Ref Range Status   06/21/2022 0.25 (L) 0.30 - 5.00 uIU/mL Final     No components found for: THYROIDAB    No results found for: [id number]    Imaging Results   Last thyroid ultrasound:  No results found for this or any previous visit.      Last thyroid nuclear scan:  No results found for this or any previous visit.      Current Medications     Outpatient Medications Prior to Visit   Medication Sig Dispense Refill     amLODIPine (NORVASC) 10 MG tablet [AMLODIPINE (NORVASC) 10 MG TABLET]        atorvastatin (LIPITOR) 10 MG tablet [ATORVASTATIN (LIPITOR) 10 MG TABLET] Take 1 tablet by mouth daily.       calcium carbonate (CALCIUM CARBONATE) 300 mg (750 mg) Chew [CALCIUM CARBONATE (CALCIUM CARBONATE) 300 MG (750 MG) CHEW] Chew 2-4 tablets daily.       cholecalciferol, vitamin D3, (VITAMIN D3 ORAL) [CHOLECALCIFEROL, VITAMIN D3, (VITAMIN D3 ORAL)] Take by mouth.       diclofenac sodium (VOLTAREN) 1 % Gel [DICLOFENAC SODIUM  (VOLTAREN) 1 % GEL] APPLY 2 GRAMS TO AFFECTED AREA 3 TIMES A DAY       hydrOXYchloroQUINE (PLAQUENIL) 200 mg tablet [HYDROXYCHLOROQUINE (PLAQUENIL) 200 MG TABLET] 2 tab(s)       lisinopril (PRINIVIL,ZESTRIL) 10 MG tablet [LISINOPRIL (PRINIVIL,ZESTRIL) 10 MG TABLET] Take 5 mg by mouth daily.       multivitamin (MULTIVITAMIN) per tablet [MULTIVITAMIN (MULTIVITAMIN) PER TABLET] Take 1 tablet by mouth daily.       aspirin 81 MG EC tablet [ASPIRIN 81 MG EC TABLET] Take 81 mg by mouth daily.       dexlansoprazole (DEXILANT) 60 mg capsule [DEXLANSOPRAZOLE (DEXILANT) 60 MG CAPSULE] Take 60 mg by mouth daily.       famotidine (PEPCID) 20 MG tablet [FAMOTIDINE (PEPCID) 20 MG TABLET] 1 tab(s)       guaifenesin/phenylephrine HCl (MUCINEX COLD ORAL) [GUAIFENESIN/PHENYLEPHRINE HCL (MUCINEX COLD ORAL)] Take by mouth.       levothyroxine (SYNTHROID/LEVOTHROID) 150 MCG tablet TAKE 1 TABLET BY MOUTH DAILY FOR 6 DAYS, THEN 2 TABLETS ON THE 7TH DAY 94 tablet 0     No facility-administered medications prior to visit.         Video-Visit Details    Video Start Time: 1400    Type of service:  Video Visit    Video End Time:1420    Originating Location (pt. Location): Home    Distant Location (provider location):  [facility]     Platform used for Video Visit: [facility]    Date of last OV: 6/29/21  Reason for Visit: Hypothyroidism    Thyroid Ultrasound: 7/26/19  Whole Body Scan: 2/13/2009

## 2022-07-23 ENCOUNTER — HEALTH MAINTENANCE LETTER (OUTPATIENT)
Age: 62
End: 2022-07-23

## 2022-10-01 ENCOUNTER — HEALTH MAINTENANCE LETTER (OUTPATIENT)
Age: 62
End: 2022-10-01

## 2023-06-15 ENCOUNTER — LAB (OUTPATIENT)
Dept: LAB | Facility: CLINIC | Age: 63
End: 2023-06-15
Payer: COMMERCIAL

## 2023-06-15 DIAGNOSIS — E55.9 VITAMIN D DEFICIENCY: ICD-10-CM

## 2023-06-15 DIAGNOSIS — E89.0 POSTSURGICAL HYPOTHYROIDISM: ICD-10-CM

## 2023-06-15 LAB
CREAT SERPL-MCNC: 0.67 MG/DL (ref 0.51–0.95)
GFR SERPL CREATININE-BSD FRML MDRD: >90 ML/MIN/1.73M2
T4 FREE SERPL-MCNC: 1.83 NG/DL (ref 0.9–1.7)
TSH SERPL DL<=0.005 MIU/L-ACNC: 0.38 UIU/ML (ref 0.3–4.2)

## 2023-06-15 PROCEDURE — 82565 ASSAY OF CREATININE: CPT

## 2023-06-15 PROCEDURE — 84439 ASSAY OF FREE THYROXINE: CPT

## 2023-06-15 PROCEDURE — 36415 COLL VENOUS BLD VENIPUNCTURE: CPT

## 2023-06-15 PROCEDURE — 82306 VITAMIN D 25 HYDROXY: CPT

## 2023-06-15 PROCEDURE — 84443 ASSAY THYROID STIM HORMONE: CPT

## 2023-06-16 LAB — DEPRECATED CALCIDIOL+CALCIFEROL SERPL-MC: 62 UG/L (ref 20–75)

## 2023-06-22 ENCOUNTER — LAB REQUISITION (OUTPATIENT)
Dept: LAB | Facility: CLINIC | Age: 63
End: 2023-06-22

## 2023-06-22 DIAGNOSIS — Z13.6 ENCOUNTER FOR SCREENING FOR CARDIOVASCULAR DISORDERS: ICD-10-CM

## 2023-06-22 LAB
CHOLEST SERPL-MCNC: 142 MG/DL
HDLC SERPL-MCNC: 52 MG/DL
LDLC SERPL CALC-MCNC: 63 MG/DL
NONHDLC SERPL-MCNC: 90 MG/DL
TRIGL SERPL-MCNC: 133 MG/DL

## 2023-06-22 PROCEDURE — 80061 LIPID PANEL: CPT | Performed by: FAMILY MEDICINE

## 2023-06-26 ASSESSMENT — ENCOUNTER SYMPTOMS
SMELL DISTURBANCE: 0
NAUSEA: 0
ARTHRALGIAS: 1
NECK PAIN: 0
MUSCLE WEAKNESS: 0
HEARTBURN: 1
BLOOD IN STOOL: 0
CONSTIPATION: 0
TASTE DISTURBANCE: 0
STIFFNESS: 1
TROUBLE SWALLOWING: 0
MYALGIAS: 1
MUSCLE CRAMPS: 0
RECTAL PAIN: 0
SINUS CONGESTION: 0
JAUNDICE: 0
JOINT SWELLING: 0
SORE THROAT: 0
VOMITING: 0
DIARRHEA: 1
ABDOMINAL PAIN: 0
SINUS PAIN: 0
BACK PAIN: 0
NECK MASS: 0
HOARSE VOICE: 0
BLOATING: 1
BOWEL INCONTINENCE: 0

## 2023-06-27 ENCOUNTER — OFFICE VISIT (OUTPATIENT)
Dept: ENDOCRINOLOGY | Facility: CLINIC | Age: 63
End: 2023-06-27
Payer: COMMERCIAL

## 2023-06-27 VITALS
DIASTOLIC BLOOD PRESSURE: 74 MMHG | BODY MASS INDEX: 42.43 KG/M2 | HEART RATE: 72 BPM | WEIGHT: 262.9 LBS | SYSTOLIC BLOOD PRESSURE: 120 MMHG

## 2023-06-27 DIAGNOSIS — E89.0 POSTSURGICAL HYPOTHYROIDISM: Primary | ICD-10-CM

## 2023-06-27 PROBLEM — D12.6 ADENOMATOUS POLYP OF COLON, UNSPECIFIED PART OF COLON: Status: ACTIVE | Noted: 2023-06-27

## 2023-06-27 PROBLEM — Z90.710 HISTORY OF HYSTERECTOMY: Status: ACTIVE | Noted: 2023-06-27

## 2023-06-27 PROCEDURE — 99214 OFFICE O/P EST MOD 30 MIN: CPT | Performed by: NURSE PRACTITIONER

## 2023-06-27 RX ORDER — LEVOTHYROXINE SODIUM 150 UG/1
TABLET ORAL
Qty: 112 TABLET | Refills: 3 | Status: SHIPPED | OUTPATIENT
Start: 2023-06-27 | End: 2024-03-19

## 2023-06-27 NOTE — LETTER
"    6/27/2023         RE: Bee Ernst  [id number] [facility] [id number]        Dear Colleague,    Thank you for referring your patient, Bee Ernst, to the [facility]. Please see a copy of my visit note below.    [facility] ENDOCRINOLOGY    Thyroid Note  6/27/2023    Bee rEnst, 1960, [id number]          Reason for visit      1. Postsurgical hypothyroidism        HPI     Bee Ernst is a very pleasant 63 year old old female who presents for follow up.  SUMMARY:    Bee is here todayin f/u for Postoperative Hypothyroidism. She reports that \"all things considered\" she is feeling well. It is believed that she may have Lupus, but has not been definitively diagnosed.  She feels that her energy level is currently sufficient. Her current TSH is 0.38 and fT4 is 1.83.  She is having no problems referable to her neck at the present time. She is taking Levothyroxine, 150 mcg 6 days a week, and 300 on the 7th. . She continues to be more conscientious about taking it on an empty stomach and this may be why she has been absorbing more that she has traditionally.      Vit D level is currently good at 62. She takes about 3000 international unit(s) daily     Pt continues to struggle with weight loss. She eats a very clean diet with little CHO. She continues not do a lot of regular movement. Referral was made to Bariatric Dept a year ago for non-surgical intervention, and the appointment was not kept.     Past Medical History     Patient Active Problem List   Diagnosis     BMI 39.0-39.9,adult     Chronic Reflux Esophagitis     Changed Sexual Interest (Libido): Decreased     Papillary Carcinoma Of The Thyroid Gland     Postsurgical Hypothyroidism     Skin Tag (___ Mm)     Essential hypertension     Hyperlipidemia     Prediabetes     Morbid obesity (H)     Gastroesophageal reflux disease     History of malignant neoplasm of thyroid     Hypothyroidism     Inflammatory arthritis     Other " specified abnormal immunological findings in serum     Raynaud's disease     Sciatica     Benign neoplasm of ascending colon     Diaphragmatic hernia     Elevated antinuclear antibody (MARLEE) level     Fibromyalgia     Epigastric burning sensation     Insomnia     Pain of hand     Polyp of colon     Systemic lupus erythematosus (H)     Adenomatous polyp of colon, unspecified part of colon     History of hysterectomy       Family History       family history includes Breast Cancer (age of onset: 62.00) in her mother.    Social History      reports that she quit smoking about 14 years ago. Her smoking use included cigarettes. She has never used smokeless tobacco. She reports current alcohol use. She reports that she does not use drugs.      Review of Systems     Patient denies fatigue, weight changes, heat/cold intolerance, bowel/skin changes or CVS symptoms.   Remainder per HPI and per attached intake form.    Answers for HPI/ROS submitted by the patient on 6/26/2023  General Symptoms: No  Skin Symptoms: No  HENT Symptoms: Yes  EYE SYMPTOMS: No  HEART SYMPTOMS: No  LUNG SYMPTOMS: No  INTESTINAL SYMPTOMS: Yes  URINARY SYMPTOMS: No  GYNECOLOGIC SYMPTOMS: No  BREAST SYMPTOMS: No  SKELETAL SYMPTOMS: Yes  BLOOD SYMPTOMS: No  NERVOUS SYSTEM SYMPTOMS: No  MENTAL HEALTH SYMPTOMS: No  Ear pain: No  Ear discharge: No  Hearing loss: No  Tinnitus: Yes  Nosebleeds: No  Congestion: No  Sinus pain: No  Trouble swallowing: No   Voice hoarseness: No  Mouth sores: Yes  Sore throat: No  Tooth pain: No  Gum tenderness: No  Bleeding gums: No  Change in taste: No  Change in sense of smell: No  Dry mouth: No  Hearing aid used: No  Neck lump: No  Heart burn or indigestion: Yes  Nausea: No  Vomiting: No  Abdominal pain: No  Bloating: Yes  Constipation: No  Diarrhea: Yes  Blood in stool: No  Black stools: No  Rectal or Anal pain: No  Fecal incontinence: No  Yellowing of skin or eyes: No  Vomit with blood: No  Change in stools: No  Back pain:  No  Muscle aches: Yes  Neck pain: No  Swollen joints: No  Joint pain: Yes  Bone pain: No  Muscle cramps: No  Muscle weakness: No  Joint stiffness: Yes  Bone fracture: No        Vital Signs     /74 (BP Location: Right arm, Patient Position: Sitting, Cuff Size: Adult Regular)   Pulse 72   Wt 119.3 kg (262 lb 14.4 oz)   BMI 42.43 kg/m    Wt Readings from Last 3 Encounters:   06/27/23 119.3 kg (262 lb 14.4 oz)   05/23/19 119.1 kg (262 lb 9.6 oz)   05/23/19 118.8 kg (262 lb)       Physical Exam     General:  Normal, NIRD,appears euthyroid  Eyes:  Pupils equal, round and reactive to light; no proptosis, lid lag or  periorbital edema.  Thyroid:  Thyroid is absent.  No tenderness or bruit  Neck: No lymph nodes  Musculoskeletal:  Muscle strength grossly normal without evidence of wasting.  Heart:  Regular rate and rhythm without murmur.  Lungs:  Clear to auscultation.  Abdomen: Soft, non-tender, no masses or organomegaly  Neuro: Patella Reflexes were normal.No tremors  Skin:  No acanthosis nigricans or vitiligo          Assessment     1. Postsurgical hypothyroidism            Plan     Pt is bio-chemically and and physically euthyroid. She will continue on her current dose and will f/u with me in 1 year.     Continue on your Vit D Supplements.    TSH is at a good place to help with weight loss.         Christine Harry NP   Endocrinology  6/27/2023  7:52 PM        Lab Results     TSH   Date Value Ref Range Status   06/15/2023 0.38 0.30 - 4.20 uIU/mL Final   06/21/2022 0.25 (L) 0.30 - 5.00 uIU/mL Final     No components found for: THYROIDAB    No results found for: [id number]    Imaging Results   Last thyroid ultrasound:  No results found for this or any previous visit.      Last thyroid nuclear scan:  No results found for this or any previous visit.      Current Medications     Outpatient Medications Prior to Visit   Medication Sig Dispense Refill     amLODIPine (NORVASC) 10 MG tablet [AMLODIPINE (NORVASC) 10 MG TABLET]         atorvastatin (LIPITOR) 10 MG tablet [ATORVASTATIN (LIPITOR) 10 MG TABLET] Take 1 tablet by mouth daily.       calcium carbonate (CALCIUM CARBONATE) 300 mg (750 mg) Chew [CALCIUM CARBONATE (CALCIUM CARBONATE) 300 MG (750 MG) CHEW] Chew 2-4 tablets daily.       cholecalciferol, vitamin D3, (VITAMIN D3 ORAL) [CHOLECALCIFEROL, VITAMIN D3, (VITAMIN D3 ORAL)] Take by mouth.       diclofenac sodium (VOLTAREN) 1 % Gel [DICLOFENAC SODIUM (VOLTAREN) 1 % GEL] APPLY 2 GRAMS TO AFFECTED AREA 3 TIMES A DAY       hydrOXYchloroQUINE (PLAQUENIL) 200 mg tablet [HYDROXYCHLOROQUINE (PLAQUENIL) 200 MG TABLET] 2 tab(s)       lisinopril (PRINIVIL,ZESTRIL) 10 MG tablet [LISINOPRIL (PRINIVIL,ZESTRIL) 10 MG TABLET] Take 5 mg by mouth daily.       multivitamin (MULTIVITAMIN) per tablet [MULTIVITAMIN (MULTIVITAMIN) PER TABLET] Take 1 tablet by mouth daily.       levothyroxine (SYNTHROID/LEVOTHROID) 150 MCG tablet TAKE 1 TABLET BY MOUTH DAILY FOR 6 DAYS, THEN 2 TABLETS ON THE 7TH  tablet 3     No facility-administered medications prior to visit.             Again, thank you for allowing me to participate in the care of your patient.        Sincerely,        Christine Harry NP

## 2023-06-28 NOTE — PROGRESS NOTES
"[facility] ENDOCRINOLOGY    Thyroid Note  6/27/2023    Bee Ernst, 1960, [id number]          Reason for visit      1. Postsurgical hypothyroidism        HPI     Bee Ernst is a very pleasant 63 year old old female who presents for follow up.  SUMMARY:    Bee is here todayin f/u for Postoperative Hypothyroidism. She reports that \"all things considered\" she is feeling well. It is believed that she may have Lupus, but has not been definitively diagnosed.  She feels that her energy level is currently sufficient. Her current TSH is 0.38 and fT4 is 1.83.  She is having no problems referable to her neck at the present time. She is taking Levothyroxine, 150 mcg 6 days a week, and 300 on the 7th. . She continues to be more conscientious about taking it on an empty stomach and this may be why she has been absorbing more that she has traditionally.      Vit D level is currently good at 62. She takes about 3000 international unit(s) daily     Pt continues to struggle with weight loss. She eats a very clean diet with little CHO. She continues not do a lot of regular movement. Referral was made to Bariatric Dept a year ago for non-surgical intervention, and the appointment was not kept.     Past Medical History     Patient Active Problem List   Diagnosis     BMI 39.0-39.9,adult     Chronic Reflux Esophagitis     Changed Sexual Interest (Libido): Decreased     Papillary Carcinoma Of The Thyroid Gland     Postsurgical Hypothyroidism     Skin Tag (___ Mm)     Essential hypertension     Hyperlipidemia     Prediabetes     Morbid obesity (H)     Gastroesophageal reflux disease     History of malignant neoplasm of thyroid     Hypothyroidism     Inflammatory arthritis     Other specified abnormal immunological findings in serum     Raynaud's disease     Sciatica     Benign neoplasm of ascending colon     Diaphragmatic hernia     Elevated antinuclear antibody (MARLEE) level     Fibromyalgia     Epigastric burning sensation "     Insomnia     Pain of hand     Polyp of colon     Systemic lupus erythematosus (H)     Adenomatous polyp of colon, unspecified part of colon     History of hysterectomy       Family History       family history includes Breast Cancer (age of onset: 62.00) in her mother.    Social History      reports that she quit smoking about 14 years ago. Her smoking use included cigarettes. She has never used smokeless tobacco. She reports current alcohol use. She reports that she does not use drugs.      Review of Systems     Patient denies fatigue, weight changes, heat/cold intolerance, bowel/skin changes or CVS symptoms.   Remainder per HPI and per attached intake form.    Answers for HPI/ROS submitted by the patient on 6/26/2023  General Symptoms: No  Skin Symptoms: No  HENT Symptoms: Yes  EYE SYMPTOMS: No  HEART SYMPTOMS: No  LUNG SYMPTOMS: No  INTESTINAL SYMPTOMS: Yes  URINARY SYMPTOMS: No  GYNECOLOGIC SYMPTOMS: No  BREAST SYMPTOMS: No  SKELETAL SYMPTOMS: Yes  BLOOD SYMPTOMS: No  NERVOUS SYSTEM SYMPTOMS: No  MENTAL HEALTH SYMPTOMS: No  Ear pain: No  Ear discharge: No  Hearing loss: No  Tinnitus: Yes  Nosebleeds: No  Congestion: No  Sinus pain: No  Trouble swallowing: No   Voice hoarseness: No  Mouth sores: Yes  Sore throat: No  Tooth pain: No  Gum tenderness: No  Bleeding gums: No  Change in taste: No  Change in sense of smell: No  Dry mouth: No  Hearing aid used: No  Neck lump: No  Heart burn or indigestion: Yes  Nausea: No  Vomiting: No  Abdominal pain: No  Bloating: Yes  Constipation: No  Diarrhea: Yes  Blood in stool: No  Black stools: No  Rectal or Anal pain: No  Fecal incontinence: No  Yellowing of skin or eyes: No  Vomit with blood: No  Change in stools: No  Back pain: No  Muscle aches: Yes  Neck pain: No  Swollen joints: No  Joint pain: Yes  Bone pain: No  Muscle cramps: No  Muscle weakness: No  Joint stiffness: Yes  Bone fracture: No        Vital Signs     /74 (BP Location: Right arm, Patient Position:  Sitting, Cuff Size: Adult Regular)   Pulse 72   Wt 119.3 kg (262 lb 14.4 oz)   BMI 42.43 kg/m    Wt Readings from Last 3 Encounters:   06/27/23 119.3 kg (262 lb 14.4 oz)   05/23/19 119.1 kg (262 lb 9.6 oz)   05/23/19 118.8 kg (262 lb)       Physical Exam     General:  Normal, NIRD,appears euthyroid  Eyes:  Pupils equal, round and reactive to light; no proptosis, lid lag or  periorbital edema.  Thyroid:  Thyroid is absent.  No tenderness or bruit  Neck: No lymph nodes  Musculoskeletal:  Muscle strength grossly normal without evidence of wasting.  Heart:  Regular rate and rhythm without murmur.  Lungs:  Clear to auscultation.  Abdomen: Soft, non-tender, no masses or organomegaly  Neuro: Patella Reflexes were normal.No tremors  Skin:  No acanthosis nigricans or vitiligo          Assessment     1. Postsurgical hypothyroidism            Plan     Pt is bio-chemically and and physically euthyroid. She will continue on her current dose and will f/u with me in 1 year.     Continue on your Vit D Supplements.    TSH is at a good place to help with weight loss.         hCristine Harry NP   Endocrinology  6/27/2023  7:52 PM        Lab Results     TSH   Date Value Ref Range Status   06/15/2023 0.38 0.30 - 4.20 uIU/mL Final   06/21/2022 0.25 (L) 0.30 - 5.00 uIU/mL Final     No components found for: THYROIDAB    No results found for: [id number]    Imaging Results   Last thyroid ultrasound:  No results found for this or any previous visit.      Last thyroid nuclear scan:  No results found for this or any previous visit.      Current Medications     Outpatient Medications Prior to Visit   Medication Sig Dispense Refill     amLODIPine (NORVASC) 10 MG tablet [AMLODIPINE (NORVASC) 10 MG TABLET]        atorvastatin (LIPITOR) 10 MG tablet [ATORVASTATIN (LIPITOR) 10 MG TABLET] Take 1 tablet by mouth daily.       calcium carbonate (CALCIUM CARBONATE) 300 mg (750 mg) Chew [CALCIUM CARBONATE (CALCIUM CARBONATE) 300 MG (750 MG) CHEW] Chew 2-4  tablets daily.       cholecalciferol, vitamin D3, (VITAMIN D3 ORAL) [CHOLECALCIFEROL, VITAMIN D3, (VITAMIN D3 ORAL)] Take by mouth.       diclofenac sodium (VOLTAREN) 1 % Gel [DICLOFENAC SODIUM (VOLTAREN) 1 % GEL] APPLY 2 GRAMS TO AFFECTED AREA 3 TIMES A DAY       hydrOXYchloroQUINE (PLAQUENIL) 200 mg tablet [HYDROXYCHLOROQUINE (PLAQUENIL) 200 MG TABLET] 2 tab(s)       lisinopril (PRINIVIL,ZESTRIL) 10 MG tablet [LISINOPRIL (PRINIVIL,ZESTRIL) 10 MG TABLET] Take 5 mg by mouth daily.       multivitamin (MULTIVITAMIN) per tablet [MULTIVITAMIN (MULTIVITAMIN) PER TABLET] Take 1 tablet by mouth daily.       levothyroxine (SYNTHROID/LEVOTHROID) 150 MCG tablet TAKE 1 TABLET BY MOUTH DAILY FOR 6 DAYS, THEN 2 TABLETS ON THE 7TH  tablet 3     No facility-administered medications prior to visit.

## 2023-08-06 ENCOUNTER — HEALTH MAINTENANCE LETTER (OUTPATIENT)
Age: 63
End: 2023-08-06

## 2024-03-03 ENCOUNTER — HEALTH MAINTENANCE LETTER (OUTPATIENT)
Age: 64
End: 2024-03-03

## 2024-03-12 ENCOUNTER — LAB (OUTPATIENT)
Dept: LAB | Facility: CLINIC | Age: 64
End: 2024-03-12
Payer: COMMERCIAL

## 2024-03-12 DIAGNOSIS — E89.0 POSTSURGICAL HYPOTHYROIDISM: ICD-10-CM

## 2024-03-12 PROCEDURE — 84443 ASSAY THYROID STIM HORMONE: CPT

## 2024-03-12 PROCEDURE — 82565 ASSAY OF CREATININE: CPT

## 2024-03-12 PROCEDURE — 82306 VITAMIN D 25 HYDROXY: CPT

## 2024-03-12 PROCEDURE — 84439 ASSAY OF FREE THYROXINE: CPT

## 2024-03-12 PROCEDURE — 36415 COLL VENOUS BLD VENIPUNCTURE: CPT

## 2024-03-13 LAB
CREAT SERPL-MCNC: 0.79 MG/DL (ref 0.51–0.95)
EGFRCR SERPLBLD CKD-EPI 2021: 83 ML/MIN/1.73M2
T4 FREE SERPL-MCNC: 1.9 NG/DL (ref 0.9–1.7)
TSH SERPL DL<=0.005 MIU/L-ACNC: 0.12 UIU/ML (ref 0.3–4.2)
VIT D+METAB SERPL-MCNC: 58 NG/ML (ref 20–50)

## 2024-03-19 ENCOUNTER — VIRTUAL VISIT (OUTPATIENT)
Dept: ENDOCRINOLOGY | Facility: CLINIC | Age: 64
End: 2024-03-19
Payer: COMMERCIAL

## 2024-03-19 DIAGNOSIS — E55.9 VITAMIN D DEFICIENCY: ICD-10-CM

## 2024-03-19 DIAGNOSIS — E89.0 POSTSURGICAL HYPOTHYROIDISM: Primary | ICD-10-CM

## 2024-03-19 PROCEDURE — 99214 OFFICE O/P EST MOD 30 MIN: CPT | Mod: 95 | Performed by: NURSE PRACTITIONER

## 2024-03-19 RX ORDER — OMEPRAZOLE 40 MG/1
40 CAPSULE, DELAYED RELEASE ORAL 2 TIMES DAILY
COMMUNITY
Start: 2024-02-28

## 2024-03-19 RX ORDER — LEVOTHYROXINE SODIUM 150 UG/1
TABLET ORAL
Qty: 112 TABLET | Refills: 3 | Status: SHIPPED | OUTPATIENT
Start: 2024-03-19

## 2024-03-19 RX ORDER — ASPIRIN 81 MG/1
81 TABLET ORAL
COMMUNITY

## 2024-03-19 NOTE — PROGRESS NOTES
[facility] ENDOCRINOLOGY    Thyroid Note  3/19/2024    Bee Ernst, 1960, [id number]          Reason for visit      1. Postsurgical hypothyroidism    2. Vitamin D deficiency        HPI     Bee Ernst is a very pleasant 64 year old old female who presents for follow up.  SUMMARY:    Bee is seen via video Visit in follow-up for Postsurgical Hypothyroidism. Other than currently dealing with a Viral infection, she is feeling well. Her current TSH is 0.12 and fT4 is 1.90.  She denies any fatigue, weight changes, heat/cold intolerance, bowel/skin changes or CVS symptoms. She is taking Levothyroxine 150 mcg daily 6 days a week, and 300 mcg on the 7th. She is having no problems referable to her neck.     Vit D is currently normal at 43. She is not currently taking any in supplementation.     Past Medical History     Patient Active Problem List   Diagnosis    BMI 39.0-39.9,adult    Chronic Reflux Esophagitis    Changed Sexual Interest (Libido): Decreased    Papillary Carcinoma Of The Thyroid Gland    Postsurgical Hypothyroidism    Skin Tag (___ Mm)    Essential hypertension    Hyperlipidemia    Prediabetes    Morbid obesity (H)    Gastroesophageal reflux disease    History of malignant neoplasm of thyroid    Hypothyroidism    Inflammatory arthritis    Other specified abnormal immunological findings in serum    Raynaud's disease    Sciatica    Benign neoplasm of ascending colon    Diaphragmatic hernia    Elevated antinuclear antibody (MARLEE) level    Fibromyalgia    Epigastric burning sensation    Insomnia    Pain of hand    Polyp of colon    Systemic lupus erythematosus (H)    Adenomatous polyp of colon, unspecified part of colon    History of hysterectomy       Family History       family history includes Breast Cancer (age of onset: 62.00) in her mother.    Social History      reports that she quit smoking about 15 years ago. Her smoking use included cigarettes. She has never used smokeless tobacco. She  "reports current alcohol use. She reports that she does not use drugs.      Review of Systems     Patient denies fatigue, weight changes, heat/cold intolerance, bowel/skin changes or CVS symptoms.   Remainder per HPI and per attached intake form.      Vital Signs     There were no vitals taken for this visit.  Wt Readings from Last 3 Encounters:   06/27/23 119.3 kg (262 lb 14.4 oz)   05/23/19 119.1 kg (262 lb 9.6 oz)   05/23/19 118.8 kg (262 lb)       Physical Exam     Constitutional:  Well developed, Well nourished  HENT:  Normocephalic,   Neck: normal in appearance  Eyes:  PERRL, Conjunctiva pink  Respiratory:  No respiratory distress  Skin: No acanthosis nigricans, lipoatrophy or lipodystrophy  Neurologic:  Alert & oriented x 3, nonfocal  Psychiatric:  Affect, Mood, Insight appropriate        Assessment     1. Postsurgical hypothyroidism    2. Vitamin D deficiency            Plan     Pt is bio-chemically and and physically euthyroid. She will continue on her current dose and will f/u with me in 1 year.     Vit D is currently normal.         Christine Harry NP   Endocrinology  3/19/2024  10:54 AM    Lab Results     TSH   Date Value Ref Range Status   03/12/2024 0.12 (L) 0.30 - 4.20 uIU/mL Final   06/21/2022 0.25 (L) 0.30 - 5.00 uIU/mL Final     No components found for: \"THYROIDAB\"    No results found for: \"[id number]\"    Imaging Results   Last thyroid ultrasound:  No results found for this or any previous visit.      Last thyroid nuclear scan:  No results found for this or any previous visit.      Current Medications     Outpatient Medications Prior to Visit   Medication Sig Dispense Refill    amLODIPine (NORVASC) 10 MG tablet [AMLODIPINE (NORVASC) 10 MG TABLET]       aspirin 81 MG EC tablet Take 81 mg by mouth      atorvastatin (LIPITOR) 10 MG tablet [ATORVASTATIN (LIPITOR) 10 MG TABLET] Take 1 tablet by mouth daily.      calcium carbonate (CALCIUM CARBONATE) 300 mg (750 mg) Chew [CALCIUM CARBONATE (CALCIUM " CARBONATE) 300 MG (750 MG) CHEW] Chew 2-4 tablets daily.      diclofenac sodium (VOLTAREN) 1 % Gel [DICLOFENAC SODIUM (VOLTAREN) 1 % GEL] APPLY 2 GRAMS TO AFFECTED AREA 3 TIMES A DAY      lisinopril (PRINIVIL,ZESTRIL) 10 MG tablet [LISINOPRIL (PRINIVIL,ZESTRIL) 10 MG TABLET] Take 5 mg by mouth daily.      multivitamin (MULTIVITAMIN) per tablet [MULTIVITAMIN (MULTIVITAMIN) PER TABLET] Take 1 tablet by mouth daily.      omeprazole (PRILOSEC) 40 MG DR capsule Take 40 mg by mouth 2 times daily      cholecalciferol, vitamin D3, (VITAMIN D3 ORAL) [CHOLECALCIFEROL, VITAMIN D3, (VITAMIN D3 ORAL)] Take by mouth. (Patient not taking: Reported on 3/19/2024)      hydrOXYchloroQUINE (PLAQUENIL) 200 mg tablet [HYDROXYCHLOROQUINE (PLAQUENIL) 200 MG TABLET] 2 tab(s)      levothyroxine (SYNTHROID/LEVOTHROID) 150 MCG tablet TAKE 1 TABLET BY MOUTH DAILY FOR 6 DAYS, THEN 2 TABLETS ON THE 7TH  tablet 3     No facility-administered medications prior to visit.           Virtual Visit Details    Type of service:  Video Visit     Originating Location (pt. Location): Home    Distant Location (provider location):  On-site  Platform used for Video Visit: Joel

## 2024-03-19 NOTE — LETTER
3/19/2024         RE: Bee Ernst  [id number] [address] [facility] [address]        Dear Colleague,    Thank you for referring your patient, Bee Ernst, to the [facility]. Please see a copy of my visit note below.    [facility] ENDOCRINOLOGY    Thyroid Note  3/19/2024    Bee Ernst, 1960, [id number]          Reason for visit      1. Postsurgical hypothyroidism    2. Vitamin D deficiency        HPI     Bee Ernst is a very pleasant 64 year old old female who presents for follow up.  SUMMARY:    Bee is seen via video Visit in follow-up for Postsurgical Hypothyroidism. Other than currently dealing with a Viral infection, she is feeling well. Her current TSH is 0.12 and fT4 is 1.90.  She denies any fatigue, weight changes, heat/cold intolerance, bowel/skin changes or CVS symptoms. She is taking Levothyroxine 150 mcg daily 6 days a week, and 300 mcg on the 7th. She is having no problems referable to her neck.     Vit D is currently normal at 43. She is not currently taking any in supplementation.     Past Medical History     Patient Active Problem List   Diagnosis     BMI 39.0-39.9,adult     Chronic Reflux Esophagitis     Changed Sexual Interest (Libido): Decreased     Papillary Carcinoma Of The Thyroid Gland     Postsurgical Hypothyroidism     Skin Tag (___ Mm)     Essential hypertension     Hyperlipidemia     Prediabetes     Morbid obesity (H)     Gastroesophageal reflux disease     History of malignant neoplasm of thyroid     Hypothyroidism     Inflammatory arthritis     Other specified abnormal immunological findings in serum     Raynaud's disease     Sciatica     Benign neoplasm of ascending colon     Diaphragmatic hernia     Elevated antinuclear antibody (MARLEE) level     Fibromyalgia     Epigastric burning sensation     Insomnia     Pain of hand     Polyp of colon     Systemic lupus erythematosus (H)     Adenomatous polyp of colon, unspecified part of colon     History of  "hysterectomy       Family History       family history includes Breast Cancer (age of onset: 62.00) in her mother.    Social History      reports that she quit smoking about 15 years ago. Her smoking use included cigarettes. She has never used smokeless tobacco. She reports current alcohol use. She reports that she does not use drugs.      Review of Systems     Patient denies fatigue, weight changes, heat/cold intolerance, bowel/skin changes or CVS symptoms.   Remainder per HPI and per attached intake form.      Vital Signs     There were no vitals taken for this visit.  Wt Readings from Last 3 Encounters:   06/27/23 119.3 kg (262 lb 14.4 oz)   05/23/19 119.1 kg (262 lb 9.6 oz)   05/23/19 118.8 kg (262 lb)       Physical Exam     Constitutional:  Well developed, Well nourished  HENT:  Normocephalic,   Neck: normal in appearance  Eyes:  PERRL, Conjunctiva pink  Respiratory:  No respiratory distress  Skin: No acanthosis nigricans, lipoatrophy or lipodystrophy  Neurologic:  Alert & oriented x 3, nonfocal  Psychiatric:  Affect, Mood, Insight appropriate        Assessment     1. Postsurgical hypothyroidism    2. Vitamin D deficiency            Plan     Pt is bio-chemically and and physically euthyroid. She will continue on her current dose and will f/u with me in 1 year.     Vit D is currently normal.         Christine Harry NP   Endocrinology  3/19/2024  10:54 AM    Lab Results     TSH   Date Value Ref Range Status   03/12/2024 0.12 (L) 0.30 - 4.20 uIU/mL Final   06/21/2022 0.25 (L) 0.30 - 5.00 uIU/mL Final     No components found for: \"THYROIDAB\"    No results found for: \"[id number]\"    Imaging Results   Last thyroid ultrasound:  No results found for this or any previous visit.      Last thyroid nuclear scan:  No results found for this or any previous visit.      Current Medications     Outpatient Medications Prior to Visit   Medication Sig Dispense Refill     amLODIPine (NORVASC) 10 MG tablet [AMLODIPINE (NORVASC) 10 MG " TABLET]        aspirin 81 MG EC tablet Take 81 mg by mouth       atorvastatin (LIPITOR) 10 MG tablet [ATORVASTATIN (LIPITOR) 10 MG TABLET] Take 1 tablet by mouth daily.       calcium carbonate (CALCIUM CARBONATE) 300 mg (750 mg) Chew [CALCIUM CARBONATE (CALCIUM CARBONATE) 300 MG (750 MG) CHEW] Chew 2-4 tablets daily.       diclofenac sodium (VOLTAREN) 1 % Gel [DICLOFENAC SODIUM (VOLTAREN) 1 % GEL] APPLY 2 GRAMS TO AFFECTED AREA 3 TIMES A DAY       lisinopril (PRINIVIL,ZESTRIL) 10 MG tablet [LISINOPRIL (PRINIVIL,ZESTRIL) 10 MG TABLET] Take 5 mg by mouth daily.       multivitamin (MULTIVITAMIN) per tablet [MULTIVITAMIN (MULTIVITAMIN) PER TABLET] Take 1 tablet by mouth daily.       omeprazole (PRILOSEC) 40 MG DR capsule Take 40 mg by mouth 2 times daily       cholecalciferol, vitamin D3, (VITAMIN D3 ORAL) [CHOLECALCIFEROL, VITAMIN D3, (VITAMIN D3 ORAL)] Take by mouth. (Patient not taking: Reported on 3/19/2024)       hydrOXYchloroQUINE (PLAQUENIL) 200 mg tablet [HYDROXYCHLOROQUINE (PLAQUENIL) 200 MG TABLET] 2 tab(s)       levothyroxine (SYNTHROID/LEVOTHROID) 150 MCG tablet TAKE 1 TABLET BY MOUTH DAILY FOR 6 DAYS, THEN 2 TABLETS ON THE 7TH  tablet 3     No facility-administered medications prior to visit.           Virtual Visit Details    Type of service:  Video Visit     Originating Location (pt. Location): Home    Distant Location (provider location):  On-site  Platform used for Video Visit: AmWell      Again, thank you for allowing me to participate in the care of your patient.        Sincerely,        Christine Harry NP

## 2024-06-18 ENCOUNTER — OFFICE VISIT (OUTPATIENT)
Dept: SURGERY | Facility: CLINIC | Age: 64
End: 2024-06-18
Payer: COMMERCIAL

## 2024-06-18 VITALS
BODY MASS INDEX: 46.45 KG/M2 | DIASTOLIC BLOOD PRESSURE: 70 MMHG | WEIGHT: 278.8 LBS | HEIGHT: 65 IN | SYSTOLIC BLOOD PRESSURE: 124 MMHG

## 2024-06-18 DIAGNOSIS — R51.9 NONINTRACTABLE HEADACHE, UNSPECIFIED CHRONICITY PATTERN, UNSPECIFIED HEADACHE TYPE: ICD-10-CM

## 2024-06-18 DIAGNOSIS — M76.62 ACHILLES TENDINITIS OF BOTH LOWER EXTREMITIES: ICD-10-CM

## 2024-06-18 DIAGNOSIS — M72.2 PLANTAR FASCIITIS: ICD-10-CM

## 2024-06-18 DIAGNOSIS — E66.01 SEVERE OBESITY (BMI >= 40) (H): Primary | ICD-10-CM

## 2024-06-18 DIAGNOSIS — E88.810 METABOLIC SYNDROME: ICD-10-CM

## 2024-06-18 DIAGNOSIS — K21.9 GASTROESOPHAGEAL REFLUX DISEASE WITHOUT ESOPHAGITIS: ICD-10-CM

## 2024-06-18 DIAGNOSIS — M76.61 ACHILLES TENDINITIS OF BOTH LOWER EXTREMITIES: ICD-10-CM

## 2024-06-18 DIAGNOSIS — D89.89 AUTOIMMUNE DISORDER (H): ICD-10-CM

## 2024-06-18 DIAGNOSIS — E78.5 DYSLIPIDEMIA: ICD-10-CM

## 2024-06-18 DIAGNOSIS — G47.33 OSA (OBSTRUCTIVE SLEEP APNEA): ICD-10-CM

## 2024-06-18 PROCEDURE — 99205 OFFICE O/P NEW HI 60 MIN: CPT | Performed by: FAMILY MEDICINE

## 2024-06-18 RX ORDER — LISINOPRIL 5 MG/1
1 TABLET ORAL
COMMUNITY
Start: 2024-05-28

## 2024-06-18 RX ORDER — PHENTERMINE HYDROCHLORIDE 37.5 MG/1
18.75-37.5 TABLET ORAL
Qty: 90 TABLET | Refills: 1 | Status: SHIPPED | OUTPATIENT
Start: 2024-06-18 | End: 2024-12-15

## 2024-06-18 NOTE — LETTER
"2024      Bee Ernst  [address] [address] [facility] [address]      Dear Colleague,    Thank you for referring your patient, Bee Ernst, to the [facility] [place]. Please see a copy of my visit note below.        New Medical Weight Management Consult    PATIENT:  Bee Ernst  MRN:         [id number]  :         1960  ADELINA:         2024    Dear Dr. Willis,    I had the pleasure of seeing your patient, Bee Ernst. Full intake/assessment was done to determine barriers to weight loss success and develop a treatment plan. Bee Ernst is a 64 year old female interested in treatment of medical problems associated with excess weight. She has a height of 5' 5.256\", a weight of 278 lbs 12.8 oz, and the calculated Body mass index is 46.03 kg/m .    ASSESSMENT/PLAN:  Sedentary  Family/Genetic predisposition (daughter had bariatric surgery)  Profound pain/fatigue intermittent with work up/ongoing care  History of phentermine use in the past  History of Papillary thyroid cancer  LILIANE not using CPAP    She will check for coverage of Wegovy and Zepbound   Start phentermine  Will have coverage for bariatric surgery and Silver sneakers in February  Chair yoga to start-pool may be a nice option with Silver Sneakers benefits as has done this in the past  RD for MNT Consider 24 week program  Consider adding Metformin, Topamax also an option.  Sleep study-start CPAP as appropriate    She has the following co-morbidities:        2024     2:26 PM   --   I have the following health issues associated with obesity High Blood Pressure    Sleep Apnea    GERD (Reflux)    Stress Incontinence    Hypothyroidism   I have the following symptoms associated with obesity Fatigue           2024     2:26 PM   Patient Goals   If yes, please indicate which surgery? Possible bariatric surgery.  tbd           2024     2:26 PM   Referring Provider   Please name the provider who referred you " "to Medical Weight Management  If you do not know, please answer \"I Don't Know\" Christine Evans           6/18/2024     2:26 PM   Weight History   How concerned are you about your weight? Very Concerned   I became overweight As a Child   The following factors have contributed to my weight gain A Health Crisis    After Quitting Smoking    Eating Wrong Types of Food    Lack of Exercise    Genetic (Runs in the Family)   I have tried the following methods to lose weight Watching Portions or Calories    Exercise    Weight Watchers    Atkins-type Diet (Low Carb/High Protein)    Nutrisystem    Medications    Other   Please list the other methods Medifast, Golo   My lowest weight since age 18 was 140   My highest weight since age 18 was 280   The most weight I have ever lost was (lbs) 65   I have the following family history of obesity/being overweight My mother is overweight    Many of my relatives are overweight   How has your weight changed over the last year? Gained   How many pounds? 15           6/18/2024     2:26 PM   Diet Recall Review with Patient   If you do eat breakfast, what types of food do you eat? coffee,greek yogurt or eggs  or skip.   If you do eat lunch, what types of food do you typically eat? chicken soup or chili, etc.   If you do eat supper, what types of food do you typically eat? meat, veg, sometimes potato,   If you do snack, what types of food do you typically eat? occassionally. lately would eat an Atkins bar   How many glasses of juice do you drink in a typical day? 0   How many of glasses of milk do you drink in a typical day? 0   How many 8oz glasses of sugar containing drinks such as Imer-Aid/sweet tea do you drink in a day? 0   How many cans/bottles of sugar pop/soda/tea/sports drinks do you drink in a day? 0   How many cans/bottles of diet pop/soda/tea or sports drink do you drink in a day? 0   How often do you have a drink of alcohol? 2-3 TImes a Week   If you do drink, how many drinks might " you have in a day? 1 or 2           6/18/2024     2:26 PM   Eating Habits   Generally, my meals include foods like these bread, pasta, rice, potatoes, corn, crackers, sweet dessert, pop, or juice A Few Times a Week   Generally, my meals include foods like these fried meats, brats, burgers, french fries, pizza, cheese, chips, or ice cream Less Than Weekly   Eat fast food (like McDonalds, Burger Ru, Taco Bell) Less Than Weekly   Eat at a buffet or sit-down restaurant Less Than Weekly   Eat most of my meals in front of the TV or computer Once a Week   Often skip meals, eat at random times, have no regular eating times Almost Everyday   Rarely sit down for a meal but snack or graze throughout Less Than Weekly   Eat extra snacks between meals Once a Week   Eat most of my food at the end of the day Less Than Weekly   Eat in the middle of the night or wake up at night to eat Never   Eat extra snacks to prevent or correct low blood sugar A Few Times a Week   Eat to prevent acid reflux or stomach pain Less Than Weekly   Worry about not having enough food to eat Never   I eat when I am depressed Never   I eat when I am stressed Never   I eat when I am bored Once a Week   I eat when I am anxious Never   I eat when I am happy or as a reward Less Than Weekly   I feel hungry all the time even if I just have eaten Less Than Weekly   Feeling full is important to me Never   I finish all the food on my plate even if I am already full Less Than Weekly   I can't resist eating delicious food or walk past the good food/smell Less Than Weekly   I eat/snack without noticing that I am eating Never   I eat when I am preparing the meal Less Than Weekly   I eat more than usual when I see others eating Never   I have trouble not eating sweets, ice cream, cookies, or chips if they are around the house Once a Week   I think about food all day Less Than Weekly   Please list any other foods you crave? ice cream           6/18/2024     2:26 PM    Amount of Food   I feel out of control when eating Never   I eat a large amount of food, like a loaf of bread, a box of cookies, a pint/quart of ice cream, all at once Never   I eat a large amount of food even when I am not hungry Never   I eat rapidly Never   I eat alone because I feel embarrassed and do not want others to see how much I have eaten Never   I eat until I am uncomfortably full Never   I feel bad, disgusted, or guilty after I overeat Never           6/18/2024     2:26 PM   Activity/Exercise History   How much of a typical 12 hour day do you spend sitting? Most of the Day   How much of a typical 12 hour day do you spend lying down? Less Than Half the Day   How much of a typical day do you spend walking/standing? Less Than Half the Day   How many hours (not including work) do you spend on the TV/Video Games/Computer/Tablet/Phone? 6 Hours or More   How many times a week are you active for the purpose of exercise? Never   What keeps you from being more active? Pain   How many total minutes do you spend doing some activity for the purpose of exercising when you exercise? Less Than 15 Minutes       PAST MEDICAL HISTORY:  Past Medical History:   Diagnosis Date     Achilles tendonitis      Autoimmune disorder (H24)      Dyslipidemia      Essential hypertension      Fibromyalgia      Gastroesophageal reflux disease without esophagitis      Headache      Hiatal hernia      Metabolic syndrome      Plantar fasciitis      Severe obesity (BMI >= 40) (H)      Thyroid cancer (H)     Pappilary           6/18/2024     2:26 PM   Work/Social History Reviewed With Patient   My employment status is Retired   My job is retired   What is your marital status? /In a Relationship   If in a relationship, is your significant other overweight? Yes   If you have children, are they overweight? Yes   Who do you live with?    Who does the food shopping?            6/18/2024     2:26 PM   Mental Health History  "Reviewed With Patient   Have you ever been physically or sexually abused? No   How often in the past 2 weeks have you felt little interest or pleasure in doing things? Not at all   Over the past 2 weeks how often have you felt down, depressed, or hopeless? Not at all           6/18/2024     2:26 PM   Sleep History Reviewed With Patient   How many hours do you sleep at night? 6       MEDICATIONS:   Current Outpatient Medications   Medication Sig Dispense Refill     amLODIPine (NORVASC) 10 MG tablet [AMLODIPINE (NORVASC) 10 MG TABLET]        aspirin 81 MG EC tablet Take 81 mg by mouth       atorvastatin (LIPITOR) 10 MG tablet [ATORVASTATIN (LIPITOR) 10 MG TABLET] Take 1 tablet by mouth daily.       calcium carbonate (CALCIUM CARBONATE) 300 mg (750 mg) Chew [CALCIUM CARBONATE (CALCIUM CARBONATE) 300 MG (750 MG) CHEW] Chew 2-4 tablets daily.       cholecalciferol, vitamin D3, (VITAMIN D3 ORAL)        diclofenac sodium (VOLTAREN) 1 % Gel [DICLOFENAC SODIUM (VOLTAREN) 1 % GEL] APPLY 2 GRAMS TO AFFECTED AREA 3 TIMES A DAY       levothyroxine (SYNTHROID/LEVOTHROID) 150 MCG tablet TAKE 1 TABLET BY MOUTH DAILY FOR 6 DAYS, THEN 2 TABLETS ON THE 7TH  tablet 3     lisinopril (ZESTRIL) 5 MG tablet Take 1 tablet by mouth daily at 2 pm       multivitamin (MULTIVITAMIN) per tablet [MULTIVITAMIN (MULTIVITAMIN) PER TABLET] Take 1 tablet by mouth daily.       omeprazole (PRILOSEC) 40 MG DR capsule Take 40 mg by mouth 2 times daily       phentermine (ADIPEX-P) 37.5 MG tablet Take 0.5-1 tablets (18.75-37.5 mg) by mouth every morning (before breakfast) for 180 days 90 tablet 1       ALLERGIES:   No Known Allergies    PHYSICAL EXAM:  /70 (BP Location: Right arm, Patient Position: Sitting, Cuff Size: Adult Small)   Ht 1.658 m (5' 5.26\")   Wt 126.5 kg (278 lb 12.8 oz)   BMI 46.03 kg/m      Waist circumference: 51.75 cm    Wt Readings from Last 4 Encounters:   06/18/24 126.5 kg (278 lb 12.8 oz)   06/27/23 119.3 kg (262 lb 14.4 " "oz)   05/23/19 119.1 kg (262 lb 9.6 oz)   05/23/19 118.8 kg (262 lb)     Pleasant and in no distress  Neck 15\"  Mallampati 2+  Heart regular  Lungs clear  Abdominal circumference 51.75\"  A & O x 3  Bilateral Lower extremity edema 1+  Gait normal      FOLLOW-UP:   scheduled.    TIME: 60 min spent on evaluation, management, counseling, education, & motivational interviewing     Sincerely,    Linda Brenner MD          Again, thank you for allowing me to participate in the care of your patient.        Sincerely,        Linda Brenner MD  "

## 2024-06-18 NOTE — PROGRESS NOTES
"    New Medical Weight Management Consult    PATIENT:  Bee Ernst  MRN:         [id number]  :         1960  ADELINA:         2024    Dear Dr. Willis,    I had the pleasure of seeing your patient, Bee Ernst. Full intake/assessment was done to determine barriers to weight loss success and develop a treatment plan. Bee Ernst is a 64 year old female interested in treatment of medical problems associated with excess weight. She has a height of 5' 5.256\", a weight of 278 lbs 12.8 oz, and the calculated Body mass index is 46.03 kg/m .    ASSESSMENT/PLAN:  Sedentary  Family/Genetic predisposition (daughter had bariatric surgery)  Profound pain/fatigue intermittent with work up/ongoing care  History of phentermine use in the past  History of Papillary thyroid cancer  LILIANE not using CPAP    She will check for coverage of Wegovy and Zepbound   Start phentermine  Will have coverage for bariatric surgery and Silver sneakers in February  Chair yoga to start-pool may be a nice option with Silver Sneakers benefits as has done this in the past  RD for MNT Consider 24 week program  Consider adding Metformin, Topamax also an option.  Sleep study-start CPAP as appropriate    She has the following co-morbidities:        2024     2:26 PM   --   I have the following health issues associated with obesity High Blood Pressure    Sleep Apnea    GERD (Reflux)    Stress Incontinence    Hypothyroidism   I have the following symptoms associated with obesity Fatigue           2024     2:26 PM   Patient Goals   If yes, please indicate which surgery? Possible bariatric surgery.  tbd           2024     2:26 PM   Referring Provider   Please name the provider who referred you to Medical Weight Management  If you do not know, please answer \"I Don't Know\" Christine Evans           2024     2:26 PM   Weight History   How concerned are you about your weight? Very Concerned   I became overweight As a Child   The following " factors have contributed to my weight gain A Health Crisis    After Quitting Smoking    Eating Wrong Types of Food    Lack of Exercise    Genetic (Runs in the Family)   I have tried the following methods to lose weight Watching Portions or Calories    Exercise    Weight Watchers    Atkins-type Diet (Low Carb/High Protein)    Nutrisystem    Medications    Other   Please list the other methods Lili, Jessica   My lowest weight since age 18 was 140   My highest weight since age 18 was 280   The most weight I have ever lost was (lbs) 65   I have the following family history of obesity/being overweight My mother is overweight    Many of my relatives are overweight   How has your weight changed over the last year? Gained   How many pounds? 15           6/18/2024     2:26 PM   Diet Recall Review with Patient   If you do eat breakfast, what types of food do you eat? coffee,greek yogurt or eggs  or skip.   If you do eat lunch, what types of food do you typically eat? chicken soup or chili, etc.   If you do eat supper, what types of food do you typically eat? meat, veg, sometimes potato,   If you do snack, what types of food do you typically eat? occassionally. lately would eat an Atkins bar   How many glasses of juice do you drink in a typical day? 0   How many of glasses of milk do you drink in a typical day? 0   How many 8oz glasses of sugar containing drinks such as Imer-Aid/sweet tea do you drink in a day? 0   How many cans/bottles of sugar pop/soda/tea/sports drinks do you drink in a day? 0   How many cans/bottles of diet pop/soda/tea or sports drink do you drink in a day? 0   How often do you have a drink of alcohol? 2-3 TImes a Week   If you do drink, how many drinks might you have in a day? 1 or 2           6/18/2024     2:26 PM   Eating Habits   Generally, my meals include foods like these bread, pasta, rice, potatoes, corn, crackers, sweet dessert, pop, or juice A Few Times a Week   Generally, my meals include  foods like these fried meats, brats, burgers, french fries, pizza, cheese, chips, or ice cream Less Than Weekly   Eat fast food (like McDonalds, Burger Ru, Taco Bell) Less Than Weekly   Eat at a buffet or sit-down restaurant Less Than Weekly   Eat most of my meals in front of the TV or computer Once a Week   Often skip meals, eat at random times, have no regular eating times Almost Everyday   Rarely sit down for a meal but snack or graze throughout Less Than Weekly   Eat extra snacks between meals Once a Week   Eat most of my food at the end of the day Less Than Weekly   Eat in the middle of the night or wake up at night to eat Never   Eat extra snacks to prevent or correct low blood sugar A Few Times a Week   Eat to prevent acid reflux or stomach pain Less Than Weekly   Worry about not having enough food to eat Never   I eat when I am depressed Never   I eat when I am stressed Never   I eat when I am bored Once a Week   I eat when I am anxious Never   I eat when I am happy or as a reward Less Than Weekly   I feel hungry all the time even if I just have eaten Less Than Weekly   Feeling full is important to me Never   I finish all the food on my plate even if I am already full Less Than Weekly   I can't resist eating delicious food or walk past the good food/smell Less Than Weekly   I eat/snack without noticing that I am eating Never   I eat when I am preparing the meal Less Than Weekly   I eat more than usual when I see others eating Never   I have trouble not eating sweets, ice cream, cookies, or chips if they are around the house Once a Week   I think about food all day Less Than Weekly   Please list any other foods you crave? ice cream           6/18/2024     2:26 PM   Amount of Food   I feel out of control when eating Never   I eat a large amount of food, like a loaf of bread, a box of cookies, a pint/quart of ice cream, all at once Never   I eat a large amount of food even when I am not hungry Never   I eat  rapidly Never   I eat alone because I feel embarrassed and do not want others to see how much I have eaten Never   I eat until I am uncomfortably full Never   I feel bad, disgusted, or guilty after I overeat Never           6/18/2024     2:26 PM   Activity/Exercise History   How much of a typical 12 hour day do you spend sitting? Most of the Day   How much of a typical 12 hour day do you spend lying down? Less Than Half the Day   How much of a typical day do you spend walking/standing? Less Than Half the Day   How many hours (not including work) do you spend on the TV/Video Games/Computer/Tablet/Phone? 6 Hours or More   How many times a week are you active for the purpose of exercise? Never   What keeps you from being more active? Pain   How many total minutes do you spend doing some activity for the purpose of exercising when you exercise? Less Than 15 Minutes       PAST MEDICAL HISTORY:  Past Medical History:   Diagnosis Date    Achilles tendonitis     Autoimmune disorder (H24)     Dyslipidemia     Essential hypertension     Fibromyalgia     Gastroesophageal reflux disease without esophagitis     Headache     Hiatal hernia     Metabolic syndrome     Plantar fasciitis     Severe obesity (BMI >= 40) (H)     Thyroid cancer (H)     Pappilary           6/18/2024     2:26 PM   Work/Social History Reviewed With Patient   My employment status is Retired   My job is retired   What is your marital status? /In a Relationship   If in a relationship, is your significant other overweight? Yes   If you have children, are they overweight? Yes   Who do you live with?    Who does the food shopping?            6/18/2024     2:26 PM   Mental Health History Reviewed With Patient   Have you ever been physically or sexually abused? No   How often in the past 2 weeks have you felt little interest or pleasure in doing things? Not at all   Over the past 2 weeks how often have you felt down, depressed, or hopeless? Not  "at all           6/18/2024     2:26 PM   Sleep History Reviewed With Patient   How many hours do you sleep at night? 6       MEDICATIONS:   Current Outpatient Medications   Medication Sig Dispense Refill    amLODIPine (NORVASC) 10 MG tablet [AMLODIPINE (NORVASC) 10 MG TABLET]       aspirin 81 MG EC tablet Take 81 mg by mouth      atorvastatin (LIPITOR) 10 MG tablet [ATORVASTATIN (LIPITOR) 10 MG TABLET] Take 1 tablet by mouth daily.      calcium carbonate (CALCIUM CARBONATE) 300 mg (750 mg) Chew [CALCIUM CARBONATE (CALCIUM CARBONATE) 300 MG (750 MG) CHEW] Chew 2-4 tablets daily.      cholecalciferol, vitamin D3, (VITAMIN D3 ORAL)       diclofenac sodium (VOLTAREN) 1 % Gel [DICLOFENAC SODIUM (VOLTAREN) 1 % GEL] APPLY 2 GRAMS TO AFFECTED AREA 3 TIMES A DAY      levothyroxine (SYNTHROID/LEVOTHROID) 150 MCG tablet TAKE 1 TABLET BY MOUTH DAILY FOR 6 DAYS, THEN 2 TABLETS ON THE 7TH  tablet 3    lisinopril (ZESTRIL) 5 MG tablet Take 1 tablet by mouth daily at 2 pm      multivitamin (MULTIVITAMIN) per tablet [MULTIVITAMIN (MULTIVITAMIN) PER TABLET] Take 1 tablet by mouth daily.      omeprazole (PRILOSEC) 40 MG DR capsule Take 40 mg by mouth 2 times daily      phentermine (ADIPEX-P) 37.5 MG tablet Take 0.5-1 tablets (18.75-37.5 mg) by mouth every morning (before breakfast) for 180 days 90 tablet 1       ALLERGIES:   No Known Allergies    PHYSICAL EXAM:  /70 (BP Location: Right arm, Patient Position: Sitting, Cuff Size: Adult Small)   Ht 1.658 m (5' 5.26\")   Wt 126.5 kg (278 lb 12.8 oz)   BMI 46.03 kg/m      Waist circumference: 51.75 cm    Wt Readings from Last 4 Encounters:   06/18/24 126.5 kg (278 lb 12.8 oz)   06/27/23 119.3 kg (262 lb 14.4 oz)   05/23/19 119.1 kg (262 lb 9.6 oz)   05/23/19 118.8 kg (262 lb)     Pleasant and in no distress  Neck 15\"  Mallampati 2+  Heart regular  Lungs clear  Abdominal circumference 51.75\"  A & O x 3  Bilateral Lower extremity edema 1+  Gait normal      FOLLOW-UP: "   scheduled.    TIME: 60 min spent on evaluation, management, counseling, education, & motivational interviewing     Sincerely,    Linda Brenner MD

## 2024-06-18 NOTE — PATIENT INSTRUCTIONS
[facility]    Remember to: consider the 24 week program [email or web address]://[email or web address]/weight-loss-24-[email or web address]    -Eat 3 meals a day (not 2, not 5) Chew your food well/SLOW down  -Eat your protein first  -Be a water drinker/Minize liquid calories (no regular pop, no juice) skim or 1% milk OK  -Sleep 7-8 hours each night. Address sleep if problematic  -Stress management is important. Address if problematic  -Move-8000 steps daily Muscle: maintain your muscle mass (strength training 2X/wk)  -Wheat, not white (bread, pasta, crackers, pramod, bagels, tortillas, rice)  -Limit restaurant, cafeteria, take out, drive through to 2 times per week or less  -Minimize caffeine, alcohol, and night-time snacking  -Consider keeping a food diary (i.e. My Fitness Pal, Lose It, or other food tracker)  -Follow up with the dietitian      **Some lean proteins: chicken, turkey, tuna, salmon, crab, fish, shrimp, scallops, lobster, lean cuts of beef and pork, luncheon meats, veggie burgers, beans (black, lima, garbanzo, sinclair, kidney, refried), chile, cottage cheese, string cheese, other cheese, eggs, tofu, peanut butter, nuts, vegan crumbles, greek yogurt    MEDICATIONS FOR WEIGHT LOSS    PHENTERMINE (Adipex): approved in 1959 for appetite suppression.  It has stimulant effects and cannot be used with Ritalin, Concerta, or other stimulants.  It is not addictive although it's chemically related to amphetamines.  Amphetamines are addictive. The most common side effects are dry mouth, increased energy and concentration, increased pulse, and constipation.  You should not take phentermine if you have glaucoma, hyperthyroidism, or uncontrolled/untreated hypertension.  $24-$30 for 90 tablets    PHENDIMETRAZINE (Bontril): Appetite suppressant/sympathomimetic.  Controlled substance.  Side effects and contraindications similar to phentermine.  $45-$60 for 3 month supply    TOPIRAMATE (Topamax): Anti-seizure medication, also used to  prevent migraines.  Side effects include paresthesia, glaucoma, altered concentration, attention difficulties, memory and speech problems, metabolic acidosis, depression, increase in body temperature and decrease sweating, kidney stones, and weight loss.  Do not take Topamax while taking Depakote as this can cause high ammonia levels.  You must have reliable birth control as Topamax can cause birth defects.  Discontinue slowly to avoid seizure.  Insurance usually covers Topiramate.    QSYMIA (Phentermine + Topamax):  See above information about phentermine and Topamax.  Most common side effects are paresthesia, dizziness, distortion of taste, insomnia, constipation, and dry mouth.  $150-$220 per month    DIETHYLPROPION (Tenuate): Sympathomimetic amine.  Appetite suppressant.  Doses 25 mg before meals or 75 mg per day.  Most common side effects are hypertension, palpitations, EKG changes, and increased seizures in epileptics.  There can be a possible adverse reaction with alcohol.  $70-$90 per 3 months    XENICAL(Orlistat) (Ebenezer OTC): Approved in 1999.  A fat-blocker.  It reduces absorption of fat by approximately 30%.  It has beneficial effects on lipid levels.  Side effects include diarrhea, abdominal cramping, fecal incontinence, oily spotting, and flatus with discharge.  Side effects are minimized if the patient limits their dietary fat to no more than 30% of their diet.  Patients must take a multivitamin daily to avoid vitamin D, E, A, and K deficiency.  $120 per month    CONTRAVE (Naltrexone/Bupropion): Approved in 2014.  It is a combination pill including an opioid receptor blocker and a long-standing antidepressant.  Most common side effects include nausea, constipation, headache, vomiting, dizziness, trouble sleeping, dry mouth, and diarrhea.  With all antidepressants watch for mood changes and suicide ideation.  Bupropion has been known to lower the seizure threshold in those prone to seizures.  It should  not be used in a patient with a recent history of bulimia. It has been associated with liver damage from taking higher than recommended doses.  Do not use countrave if you have taken opioid medications or opioid street drugs in the past 7-10 days, if you are currently on opioids, methadone, or if you are pregnant.  Do not use contrave if you have recently stopped using alcohol or benzodiazepines.  Taper off contrave slowly.  Dosing: titrate up to 2 tablets twice daily of the Naltrexone 8 mg/ Bupropion 90 mg tablets.  $200 for 90 tablets    SAXENDA (Liraglutide (called Victoza for Type 2 Diabetes): A daily injectable (3mg daily) medication used for type 2 diabetes (Victoza). Glucagon-like peptide-1 (GLP-1) agonist. Contraindications include personal or family history of medullary thyroid cancer or MEN type 2. Acute pancreatitis has been observed in patients taking liraglutide. Liraglutide causes C-cell tumors in rats and mice. It is unknown whether liraglutide causes tumors in humans. Start at 0.6mg, increasing the dose weekly up to 3mg.     VYVANSE (Lisdexamfetamine dimesylate): a CNS stimulant used to treat ADHD. Indicated for the treatment of moderate to severe Binge Eating Disorder in Adults. Contraindicated in patients with known heart disease, structural abnormalities of the heart, serious heart arrhythmias or unexplained syncope. CNS stimulants such as vyvanse may cause manic or psychotic symptoms in patients with BPAD or pre-existing psychosis. Use with caution in patients with Raynaud's phenomenon. Most common side effects include dry mouth, insomnia, decreased appetite, increased heart rate, jittery feeling, constipation and anxiety.     WEGOVY (Semaglutide (called Ozempic for Type 2 Diabetes): A weekly injectable (2.4mg weekly) medication used for type 2 diabetes (Ozempic). Glucagon-like peptide-1 (GLP-1) agonist. Contraindications include personal or family history of medullary thyroid cancer or MEN type  2. Acute pancreatitis has been observed in patients taking Semaglutide. Semaglutide causes C-cell tumors in rats and mice. It is unknown whether Semaglutide causes tumors in humans. Start at 0.25mg, increasing to 0.5, 1.0, 1.7 then 2.4 monthly. $1200/mo    ZEPBOUND (Tirzepatide (called Mounjaro for Type 2 Diabetes): A weekly injectable medication indicated for type 2 diabetes in 2022 and for weight loss in 2023.. Glucagon-like-peptide-1 (GLP-1) agonist and Glucose-Dependent Insulinotropic Polypeptide (GIP) agonist. Contraindications include personal or family history of medullary thyroid cancer or MEN type 2. Acute pancreatitis has been observed in patients taking Tirzepatide. Tirzepatide causes C-cell tumors in rats and mice. It is unknown whether Tirzepatide causes tumors in humans. Watch for neck mass, difficulty swallowing, persistent hoarseness, and epigastric abdominal pain. Most common side effects include nausea, diarrhea, vomiting, constipation, dyspepsia, and abdominal pain. Start at 2.5mg, increasing to 5.0, 7.5, 10, 12.5 then 15mg monthly. $1,000/month

## 2024-06-19 ENCOUNTER — TELEPHONE (OUTPATIENT)
Dept: SURGERY | Facility: CLINIC | Age: 64
End: 2024-06-19
Payer: COMMERCIAL

## 2024-06-19 NOTE — TELEPHONE ENCOUNTER
PRIOR AUTHORIZATION DENIED    Medication: PHENTERMINE HCL 37.5 MG PO TABS  Insurance Company: [employer] [place] - Phone [phone] Fax [phone]  Denial Date: 6/18/2024  Denial Reason(s):     Appeal Information:     Patient Notified: No

## 2024-07-01 NOTE — PROGRESS NOTES
Bee Ernst is a 64 year old who is being evaluated via a billable telephone visit.      What phone number would you like to be contacted at? [phone]  How would you like to obtain your AVS? MyChart          Medical Weight Loss Initial Diet Evaluation  Assessment:  This patient was referred by Dr. Brenner for MNT as treatment for Morbid obesity which is impacting HTN, sleep apnea, GERD, stress incontinence, hypothyroidism, fatigue.       Bee is presenting today for a new weight management nutrition consultation. Pt has had an initial appointment with Dr. Brenner.    Weight loss medication: Phentermine.     Personal Goals: Patient has been struggling with weight loss and feels trapped in the loop of weight loss and weight gain. Patient has been at her heaviest weight within the past couple of years. Patient had various health diagnoses within the past few of years.     Anthropometrics:    Initial weight: 278.8 lbs  BMI: 46.03  Ideal body weight: 57.6 kg (126 lb 15.4 oz)  Adjusted ideal body weight: 85.1 kg (187 lb 11.1 oz)  Estimated RMR ([place]-St Jeor equation):  1,820 kcals x 1.2 (sedentary) = 2,180 kcals (for weight maintenance)    Recommended Protein Intake: 70 - 80 grams of protein/day    Medical History:  Patient Active Problem List   Diagnosis    BMI 39.0-39.9,adult    Chronic Reflux Esophagitis    Changed Sexual Interest (Libido): Decreased    Papillary Carcinoma Of The Thyroid Gland    Postsurgical Hypothyroidism    Skin Tag (___ Mm)    Essential hypertension    Hyperlipidemia    Prediabetes    Morbid obesity (H)    Gastroesophageal reflux disease    History of malignant neoplasm of thyroid    Hypothyroidism    Inflammatory arthritis    Other specified abnormal immunological findings in serum    Raynaud's disease    Sciatica    Benign neoplasm of ascending colon    Diaphragmatic hernia    Elevated antinuclear antibody (MARLEE) level    Fibromyalgia    Epigastric burning sensation    Insomnia    Pain of hand  "   Polyp of colon    Systemic lupus erythematosus (H)    Adenomatous polyp of colon, unspecified part of colon    History of hysterectomy    Gastroesophageal reflux disease without esophagitis    Dyslipidemia    Autoimmune disorder (H24)    Metabolic syndrome    Achilles tendonitis    Plantar fasciitis    Severe obesity (BMI >= 40) (H)    Headache    Thyroid cancer (H)      Diabetes: no    Nutrition History:   Food allergies/intolerances/cultural or religous food customs: No     Weight loss history:  Watching Portions or Calories, Exercise, Weight Watchers, Atkins-type Diet (Low Carb/High Protein), Nutrisystem, Medications, Medifast, GOLO - liked this plan - it focuses on \"clean eating\"    -patient feels like the phentermine has helped with her energy and appetite    -patient has smaller portions d/t GERD  -patient will cook separately from  d/t him eating a lot of steak - mainly with dinner    -daughter had bariatric surgery - bariatric surgery is not covered by insurance and injectable medications are not covered by insurance.     Vitamins/Mineral Supplementation: MVI, B complex, vitamin D, calcium    Dietary Recall:  *limits artificial sweeteners and chemicals in foods    Breakfast (8-9 AM): coffee with raw sugar and half and half and will not have food commonly - previously had greek yogurt with fruit and toppings OR WW bread with eggs and avocado  Lunch (11 AM - 1 PM): chili (beef, black beans, and vegetables \"clean stuff\") OR chicken soups  Dinner (6 PM): chicken breast with vegetables (tries to limit starch in the evening)  Typical Snacks: atkins bars or cookies, popcorn    Overnight eating: No  Eating out: 0-1x/week    Beverages:   Coffee with raw sugar and half/half  Water - 4 tall glass - ~64 ounces    Exercise:   Not set exercise routine at this time    Previously did a bootcamp gym/workouts before COVID  Patient has looked at chair yoga videos - plans to start slow    Nutrition Diagnosis (PES " statement):   Morbid obesity related to excessive energy intake as evidence by BMI of 46.03     Nutrition Intervention  Food and/or Nutrient Delivery   Placed emphasis on importance of developing a healthy meal routine, aiming for 3 meals a day and limited snacks.  Time meals/snacks every 4-6 hours to help fuel weight loss  Nutrition Education   Discussed with patient how to build a meal: the importance of including a lean/low fat protein at each meal, include a source of vegetables at a minimum of lunch and dinner and limiting carbohydrate intake  Educated on sources of lean protein, portion sizes, the amount of grams found in each source. Recommend patient to aim for 20-30g protein at each meal.  Discussed the importance of adequate hydration, with emphasis on drinking 64oz of water or zero calorie beverages per day.  Nutrition Counseling   Encouraged importance of developing routine exercise for health benefits and weight loss.  Planning to start chair yoga exercises    Goals:   Aim to have 20+ grams of protein per meal  Fuel your weight loss by having a meal/snack every 4-6 hours    Handouts provided:  Intro to MWM  Quick and Easy Meals  High Protein Breakfast Ideas    Assessment/Plan:    Pt will follow up in 3 month(s) with bariatrician and 2 month(s) with dietitian.       Phone call duration: 32 minutes      Distant Location (provider location):  Off-site    Mode of Communication:  Telephone      Nicole Hartman RD

## 2024-07-02 ENCOUNTER — VIRTUAL VISIT (OUTPATIENT)
Dept: SURGERY | Facility: CLINIC | Age: 64
End: 2024-07-02
Payer: COMMERCIAL

## 2024-07-02 DIAGNOSIS — R73.03 PREDIABETES: ICD-10-CM

## 2024-07-02 DIAGNOSIS — E66.01 MORBID OBESITY WITH BMI OF 45.0-49.9, ADULT (H): Primary | ICD-10-CM

## 2024-07-02 DIAGNOSIS — Z71.3 NUTRITIONAL COUNSELING: ICD-10-CM

## 2024-07-02 PROCEDURE — 97802 MEDICAL NUTRITION INDIV IN: CPT | Mod: 93 | Performed by: DIETITIAN, REGISTERED

## 2024-07-02 NOTE — PATIENT INSTRUCTIONS
Eat Better ? Move More ? Live Well    Eat 3 nutrient-rich meals each day     Don't skip meals--it will cause you to overeat later in the day!     Eating fiber (vegetables/fruits/whole grains) and protein with meals helps you stay full longer     Choose foods with less than 10 grams of sugar and 5 grams of fat per serving to prevent excess calories and weight re-gain   Eat around the same times each day to develop a routine eating schedule    Avoid snacking unless physically hungry.   Planned snacks: 1-2 times per day and no more than 150 calories    Eat protein first    Protein helps with healing, maintaining adequate muscle mass, reducing hunger and optimizing nutritional status    Aim for 70 - 80 grams of protein per day   Fill up on Fiber    Fiber comes from plants--fruits, veggies, whole grains, nuts/seeds and beans    Fiber is low in calories, high in phytonutrients and helps you stay full longer    Aim for 25-35 grams per day by eating fiber with meals and snacks  Eat S-[id number]    Take 20-30 minutes to eat each meal by taking small bites, chewing foods to applesauce consistency or 20-30 times before you swallow    Eating foods too fast can delay satiety/fullness signals and increase overeating   Slow down your eating by using toddler utensils, putting your fork/spoon down between bites and not watching TV or emailing during meals!   Keep a Journal          Writing down what you eat, how you feel and when you are active helps you identify new changes to work on from week to week          Look for ways to cut 100 calories from your current diet 2-3 times per day  Drink 64 ounces of 0-Calorie drinks between meals    Water    Zero calorie Propel  or Vitamin Water      SoBe [place]  Zero Calories    Crystal Light , Sugar-Free Imer-Aid , and other sugar-free lemonade or flavored langston    Keep Caffeine to less than 300mg per day ie: 3-6oz cups coffee     Work up to 45-60 minutes of physical activity most days of  the week    Helps with losing weight and prevent regaining those extra pounds!     Do a combo of cardio (walking/water exercises) and strength training (lifting weights/Vinyasa yoga)    Avoid Mindless Eating    Be present when you eat--take note of the smell, taste and quality of your food    Make a list of alternative activities you could do to prevent eating out of boredom/stress  Go for a walk, call a friend, chew gum, paint your nails, re-organize the garage, etc      LEAN PROTEIN SOURCES    Protein Source Portion Calories Grams of Protein                           Nonfat, plain Greek yogurt    (10 grams sugar or less) 3/4 cup (6 oz)  12-17   Light Yogurt (10 grams sugar or less) 3/4 cup (6 oz)  6-8   Protein Shake 1 shake 110-180 15-30   Skim/1% Milk or lactose-free milk 1 cup ( 8 oz)  8   Plain or light, flavored soymilk 1 cup  7-8   Plain or light, hemp milk 1 cup 110 6   Fat Free or 1% Cottage Cheese 1/2 cup 90 15   Part skim ricotta cheese 1/2 cup 100 14   Part skim or reduced fat cheese slices 1/4cup, 3 dice 65-80 8     Mozzarella String Cheese 1 80 8   Canned tuna, chicken, crab or salmon  (canned in water)  1/2 cup 100 15-20   White fish (broiled, grilled, baked) 3 ounces 100 21   [place]/Tuna (broiled, grilled, baked) 3 ounces 150-180 21   Shrimp, Scallops, Lobster, Crab 3 ounces 100 21   Pork loin, Pork Tenderloin 3 ounces 150 21   Boneless, skinless chicken /turkey breast                          (broiled, grilled, baked) 3 ounces 120 21   [place], [place], [place], and Venison 3 ounces 120 21   Lean cuts of red meat and pork (sirloin,   round, tenderloin, flank, ground 93%-96%) 3 ounces 170 21   Lean or Extra Lean Ground Turkey 1/2 cup 150 20   90-95% Lean [place] Burger 1 edouard 140-180 21   Low-fat casserole with lean meat 3/4 cup 200 17   Luncheon Meats                                                        (turkey, lean ham, roast beef, chicken) 3 ounces 100 21   Egg (boiled,  poached, scrambled) 1 Egg 60 7   Egg Substitute 1/2 cup 70 10   Nuts (limit to 1 serving per day)  3 Tbsp. 150 7   Nut [place] (peanut, almond)  Limit to 1 serving or less daily 1 Tbsp. 90 4   Soy Burger (varies) 1  10-15   Edamame  1/2 cup ~95 9   Garbanzo, Black, [place] Beans 1/2 cup 110 7   Refried Beans 1/2 cup 100 7   Kidney and Lima beans 1/2 cup 110 7   Tempeh 3 oz 175 18   Vegan crumbles 1/2 cup 100 14   Tofu 1/2 cup 110 14   Chili (beans and extra lean beef or turkey) 1 cup 200 23   Lentil Stew/Soup 1 cup 150 12   Black Bean Soup 1 cup 175 12     Carbohydrates  Carbohydrates fuel your body with glucose (sugar)--the energy your body needs so you can do your daily activities.  Carbohydrates offer an immediate source of energy for your body. They provide the fuel for your muscles and organs, such as your brain.     Types of Carbohydrates     Complex Carbohydrates are higher in fiber and keep you feeling full longer--helping you eat less.   These are found in nearly all plant-based foods and usually take longer for the body to digest.  They are most commonly found in whole-wheat bread, whole-grain pasta, brown rice, starchy vegetables,   and fruits  Refined Carbohydrates require almost NO WORK for digestion and break down into glucose more quickly   than complex carbohydrates. Refined carbohydrates are usually high in calories and low in nutrients and fiber--  eating more of these can lead to weight gain.  Thinking about eliminating carbohydrates???  If you do not eat enough carbohydrates, the following can occur:  Fatigue  Muscle cramps  Poor mental function  Fatigue easily results from deprivation of carbohydrates, which is seen in people who fast, possibly interfering with activities of daily living.      Thinking about eliminating carbohydrates???  If you do not eat enough carbohydrates, the following can occur:  Fatigue  Muscle cramps  Poor mental function  Fatigue easily results from deprivation of  carbohydrates, which is seen in people who fast, possibly interfering with activities of daily living    Carbohydrates are your body's first choice for fuel. If given a choice of several types of foods simultaneously, your body will use the energy from carbohydrates first.    What foods contain carbohydrates?  Choose the following foods containing carbohydrates (the BEST ones to eat):   Fruit-fresh, frozen, canned in their own juices  Whole grains:  Whole-wheat breads  Brown rice  Oatmeal  Whole-grain cereals  Other starchy foods containing a minimum of 3 grams (g) fiber/100 calories  The ingredient label should list whole wheat or whole grain as one of the first ingredients (bulgur, quinoa, buckwheat, millet, spelt, faro, kasha)  Milk or yogurt (a natural source of carbohydrates):  Low-fat milk  Fat-free milk  Yogurt   Beans or legumes     Starchy vegetables, raw or frozen:  Potatoes  Peas  Corn    AVOID or limit the following foods containing carbohydrates:  Refined sugars, such as in:  Candy  Desserts-ice cream, cakes, pies, brownies, frozen yogurt, sherbet/sorbet  Cookies  White flour: bread/pasta/crackers/rice/tortillas  Sugary snacks: sweetened cereal, granola bars, cereal bars, donuts, muffins, bagels  Sugary Drinks:  Fruit Juice, Smoothies  Sports Drinks  Regular Soda    What are typical serving sizes or portions?  The following are some serving and portion sizes for foods containing carbohydrates:  One medium piece of fruit, about 4?5 ounces (oz) (-tennis ball)  1 cup (C) berries or melon    C canned fruit    C juice (100% vegetable)    C starchy vegetables, cooked or chopped  One slice whole-grain bread  ? C brown rice, quinoa, buckwheat, millet, spelt, faro, kasha    C oatmeal (dry)    C bulgur  One small tortilla (less than 6inch diameter)    C wheat germ  1 oz pretzels     C flaked cereal        Calorie-Controlled Sample Meal Plans    Examples of small healthy meals    Breakfast   Omelet made with   cup  to   cup egg substitute or 2 eggs    cup chopped vegetables  1-2 tbsp. of light cheese     cup salsa  Medium banana    1 cup non-fat plain, Greek yogurt mixed with 1 cup berries and 1-2 Tbsp nuts or cereal   -3/4 cup skim or 1% cottage cheese    cup unsweetened whole-grain cereal  1/2 cup of fresh strawberries  Whole-wheat English muffin or mini bagel, 1 scrambled egg and 1 slice Swiss cheese   Small orange  Protein Bar or Shake (15-30 grams protein and 15-25 grams Carbohydrates)    cup cottage cheese, low-fat    cup fresh fruit    11 ounces of Slim Fast Low Carb (only), Ramez's Advantage, EAS Carb Control    Lunch/Dinner  2-3 slices roasted turkey breast  1 tbsp. of fat free mayonnaise  2 slices of  whole-wheat bread, Medium apple  10 baby carrots with 1 tbsp. of low-fat dip     cup water packed tuna or chicken  1 tablespoons of low-fat mayonnaise  1-2 tbsp. dill relish  1 serving of whole-grain crackers  1 cup of strawberries   6 inch turkey sub sandwich with light mayonnaise,   cup cottage cheese                                                                                                                                                      Black bean and low-fat cheese on a whole wheat tortilla with salsa and light sour cream  Grilled chicken sandwich  Tossed salad with light dressing    Baked potato with 3/4 cup of extra lean ground beef, light shredded cheese and salsa  Fresh fruit                                                 Chicken chunks with lettuce and vegetables stuffed in pramod  Steamed broccoli                                                 3 oz boneless/skinless chicken breast  1/2 cup brown rice with stir-fried vegetables    grapefruit  3 ounces of salmon, trout, or tuna  1 cup of steamed asparagus  1 small slice whole grain Italian bread  Broiled white or pink fish  3/4 cup whole wheat pasta with tomatoes  3/4 cup of roasted red peppers  3 oz. of extra lean (93/7) hamburger on a Arnold's  [place] Thins  Tossed salad with light dressing       Black bean or Tuscan bean soup with grated mozzarella cheese    of a flour tortilla    3 ounces of grilled pork loin with 1 tbsp. of low-sugar barbeque sauce, 1 cup of green beans seasoned with pepper  Small dinner roll or   cup of grapefruit sections    1-2 cups of torn cruz    cup of garbanzo beans or diced skinless chicken breast  5-6 cherry tomatoes  1  tbsp. of crumbled feta cheese  1 tbsp. of roasted soy nuts  1 tsp. of olive oil and 2-3 Tbsp. of balsamic or red wine vinegar  Small whole-wheat dinner roll or   cup of cut up pineapple       Quick and Easy Meals    Salad kit with rotisserie chicken, eggs, lunch meat, beans or tuna    Chicken nuggets on top of Caesar salad kit     Lunch meat sandwich or wrap with veggies (sugar snap peas, bell pepper slices, carrot sticks, celery, sliced cucumber, cherry tomatoes, etc.)    Greek or light yogurt with a handful of nuts and fruit    Cottage cheese, cherry tomatoes and Triscuit crackers    Refried bean and cheese quesadilla on whole wheat tortilla    Can of Progresso Light or [place] Well Yes soup - add additional leftover protein like chicken to boost protein    [place] cakes pancake or waffles with peanut butter or berries    Baked sweet potato with black beans and salsa and a side salad    Adult lunchable: lunch meat, string cheese, crackers and veggies    Tuna Cucumber Boats: cut cucumber in half, scoop out cucumber seeds, fill with mixture of tuna, light cantor, morales mustard, red onion, banana peppers, dried dill, salt and pepper    Protein pasta salad: whole wheat or bean pasta with chicken sausage, broccoli and italian dressing    Egg sandwich on english muffin: egg, slice of cheese and piece of ham    Grilled cheese and turkey sandwich with a side salad or cup of soup    BBQ Flatbread: Flat Out high protein flatbread with rotisserie chicken, BBQ sauce and red onion, topped with mozzarella cheese and  baked in the oven    [place] Chicken Wrap: Rotisserie chicken warmed up with Antonio's Hot Sauce in a medium size tortilla with light ranch dressing. Add mixed greens, red onion, diced tomato, 2% shredded cheddar cheese.    Cottage cheese Pizza Bowl - mix cottage cheese, marinara, mozzarella cheese, turkey pepperonis, italian seasoning, onions, bell peppers, heated up, serve with bell peppers and/or crackers    Cottage cheese Taco Bowl - mix cottage cheese, ground beef with taco seasoning, shredded cheese, lettuce, tomato, heated up    Greek cottage cheese bowl - mix cottage cheese, dill, vegetables (cucumber, bell pepper, cucumber, cherry tomatoes), salt and pepper, olives, feta, a little drizzle of olive oil     Whole wheat or bean pasta with pesto and chicken sausage     Sheet Pan Dinner: Chicken sausage, herbed baby potatoes, asparagus, carrots, and onions roasted in olive oil    Omelet with chicken or beans, low-fat cheese, veggies (bell pepper, tomato, spinach, mushroom, onions), and salsa    Whole wheat toast topped with mashed avocado, sliced tomato, and a fried egg     Berry Salad: Spinach/lettuce, berries, grilled chicken/salmon/tofu, low-fat feta cheese, with vinaigrette dressing     [place]-Linda Salad: Mixed greens, chicken breast, black beans, corn, diced tomatoes, green onions, cheddar cheese, cilantro, crushed tortilla chips, and a dressing of light ranch mixed with taco seasoning    Sweet potato, bell pepper, chickpeas, onion, and tomato sauteed in light oil with spices of choice (paprika, cumin, yolanda, garlic, cayenne, black pepper)    Cranberry Apple Quinoa Salad: quinoa, grilled chicken, diced apple, celery, green onion, unsweetened dried cranberries, and chopped pecans, with a dressing of olive oil, morales mustard, and honey    Asian Cabbage Salad: Sliced green and red cabbage, sliced bell pepper, edamame, shredded carrots, cilantro, slivered almonds, and grilled chicken or tofu, with yolanda peanut  "dressing      High protein breakfast ideas:  -[place] products (high protein brand) - oatmeal, muffins, pancakes, etc.  -Overnight oats - there are easy \"just add water\" kinds in the grocery store or lots of recipes out there, look for one that has added protein from liquid or powder or other source  -Breakfast Egg Casseroles  -Scrambled eggs with cottage cheese whipped in  -English muffin breakfast sandwiches or burritos  -Frozen breakfast bowls OR \"Add an Egg\" bowls  -Protein bars - 10/10 rule is a good rule of thumb (Brands: [place] Protein Bar, RX bar, Skout Protein Bars)  -High protein tortillas or low carb tortilla for breakfast burrito  -Turkey, Veggie, Chicken, Black bean burgers in the frozen section - add cheese and fried egg on top  -Greek Yogurt (examples: plain greek, Oikos Triple Zero, Dannon Light N Fit, Two Good Yogurt), with choice of fresh or frozen fruit, sae seeds, hemp seeds, nuts  -1 slice whole wheat bread with mini avocado or half regular sized avocado, \"Everything But the Bagel\" seasoning, Citizen of [place] leonard on the side or on top  -2-3 light string cheeses with fruit (banana, fruit cup, berries, etc.)  -Cottage cheese and fruit on top  -Breakfast Skillet: sauteed bell peppers, onions with eggs and sprinkle of cheese (tip: batch prep sauteed peppers and onions for the week for a faster breakfast)       "

## 2024-07-02 NOTE — LETTER
7/2/2024      Bee Ernst  [id number] [address] [address]      Dear Colleague,    Thank you for referring your patient, Bee Ernst, to the [facility] [place]. Please see a copy of my visit note below.    Bee Ernst is a 64 year old who is being evaluated via a billable telephone visit.      What phone number would you like to be contacted at? [phone]  How would you like to obtain your AVS? MyChart          Medical Weight Loss Initial Diet Evaluation  Assessment:  This patient was referred by Dr. Brenner for MNT as treatment for Morbid obesity which is impacting HTN, sleep apnea, GERD, stress incontinence, hypothyroidism, fatigue.       Bee is presenting today for a new weight management nutrition consultation. Pt has had an initial appointment with Dr. Brenner.    Weight loss medication: Phentermine.     Personal Goals: Patient has been struggling with weight loss and feels trapped in the loop of weight loss and weight gain. Patient has been at her heaviest weight within the past couple of years. Patient had various health diagnoses within the past few of years.     Anthropometrics:    Initial weight: 278.8 lbs  BMI: 46.03  Ideal body weight: 57.6 kg (126 lb 15.4 oz)  Adjusted ideal body weight: 85.1 kg (187 lb 11.1 oz)  Estimated RMR (Converse-St Jeor equation):  1,820 kcals x 1.2 (sedentary) = 2,180 kcals (for weight maintenance)    Recommended Protein Intake: 70 - 80 grams of protein/day    Medical History:  Patient Active Problem List   Diagnosis     BMI 39.0-39.9,adult     Chronic Reflux Esophagitis     Changed Sexual Interest (Libido): Decreased     Papillary Carcinoma Of The Thyroid Gland     Postsurgical Hypothyroidism     Skin Tag (___ Mm)     Essential hypertension     Hyperlipidemia     Prediabetes     Morbid obesity (H)     Gastroesophageal reflux disease     History of malignant neoplasm of thyroid     Hypothyroidism     Inflammatory arthritis     Other  "specified abnormal immunological findings in serum     Raynaud's disease     Sciatica     Benign neoplasm of ascending colon     Diaphragmatic hernia     Elevated antinuclear antibody (MARLEE) level     Fibromyalgia     Epigastric burning sensation     Insomnia     Pain of hand     Polyp of colon     Systemic lupus erythematosus (H)     Adenomatous polyp of colon, unspecified part of colon     History of hysterectomy     Gastroesophageal reflux disease without esophagitis     Dyslipidemia     Autoimmune disorder (H24)     Metabolic syndrome     Achilles tendonitis     Plantar fasciitis     Severe obesity (BMI >= 40) (H)     Headache     Thyroid cancer (H)      Diabetes: no    Nutrition History:   Food allergies/intolerances/cultural or religous food customs: No     Weight loss history:  Watching Portions or Calories, Exercise, Weight Watchers, Atkins-type Diet (Low Carb/High Protein), Nutrisystem, Medications, Medifast, GOLO - liked this plan - it focuses on \"clean eating\"    -patient feels like the phentermine has helped with her energy and appetite    -patient has smaller portions d/t GERD  -patient will cook separately from  d/t him eating a lot of steak - mainly with dinner    -daughter had bariatric surgery - bariatric surgery is not covered by insurance and injectable medications are not covered by insurance.     Vitamins/Mineral Supplementation: MVI, B complex, vitamin D, calcium    Dietary Recall:  *limits artificial sweeteners and chemicals in foods    Breakfast (8-9 AM): coffee with raw sugar and half and half and will not have food commonly - previously had greek yogurt with fruit and toppings OR WW bread with eggs and avocado  Lunch (11 AM - 1 PM): chili (beef, black beans, and vegetables \"clean stuff\") OR chicken soups  Dinner (6 PM): chicken breast with vegetables (tries to limit starch in the evening)  Typical Snacks: atkins bars or cookies, popcorn    Overnight eating: No  Eating out: " 0-1x/week    Beverages:   Coffee with raw sugar and half/half  Water - 4 tall glass - ~64 ounces    Exercise:   Not set exercise routine at this time    Previously did a bootcamp gym/workouts before COVID  Patient has looked at chair yoga videos - plans to start slow    Nutrition Diagnosis (PES statement):   Morbid obesity related to excessive energy intake as evidence by BMI of 46.03     Nutrition Intervention  Food and/or Nutrient Delivery   Placed emphasis on importance of developing a healthy meal routine, aiming for 3 meals a day and limited snacks.  Time meals/snacks every 4-6 hours to help fuel weight loss  Nutrition Education   Discussed with patient how to build a meal: the importance of including a lean/low fat protein at each meal, include a source of vegetables at a minimum of lunch and dinner and limiting carbohydrate intake  Educated on sources of lean protein, portion sizes, the amount of grams found in each source. Recommend patient to aim for 20-30g protein at each meal.  Discussed the importance of adequate hydration, with emphasis on drinking 64oz of water or zero calorie beverages per day.  Nutrition Counseling   Encouraged importance of developing routine exercise for health benefits and weight loss.  Planning to start chair yoga exercises    Goals:   Aim to have 20+ grams of protein per meal  Fuel your weight loss by having a meal/snack every 4-6 hours    Handouts provided:  Intro to MWM  Quick and Easy Meals  High Protein Breakfast Ideas    Assessment/Plan:    Pt will follow up in 3 month(s) with bariatrician and 2 month(s) with dietitian.       Phone call duration: 32 minutes      Distant Location (provider location):  Off-site    Mode of Communication:  Telephone      Nicole Hartman RD         Again, thank you for allowing me to participate in the care of your patient.        Sincerely,        Nicole Hartman RD

## 2024-09-10 ENCOUNTER — VIRTUAL VISIT (OUTPATIENT)
Dept: SURGERY | Facility: CLINIC | Age: 64
End: 2024-09-10
Payer: COMMERCIAL

## 2024-09-10 DIAGNOSIS — E66.01 MORBID OBESITY WITH BMI OF 40.0-44.9, ADULT (H): Primary | ICD-10-CM

## 2024-09-10 DIAGNOSIS — R73.03 PREDIABETES: ICD-10-CM

## 2024-09-10 DIAGNOSIS — E66.813 CLASS 3 SEVERE OBESITY DUE TO EXCESS CALORIES WITH BODY MASS INDEX (BMI) OF 40.0 TO 44.9 IN ADULT (H): ICD-10-CM

## 2024-09-10 DIAGNOSIS — Z71.3 NUTRITIONAL COUNSELING: ICD-10-CM

## 2024-09-10 DIAGNOSIS — E66.01 CLASS 3 SEVERE OBESITY DUE TO EXCESS CALORIES WITH BODY MASS INDEX (BMI) OF 40.0 TO 44.9 IN ADULT (H): ICD-10-CM

## 2024-09-10 PROCEDURE — 97803 MED NUTRITION INDIV SUBSEQ: CPT | Mod: 93 | Performed by: DIETITIAN, REGISTERED

## 2024-09-10 NOTE — LETTER
9/10/2024      Bee Ernst  [address] [facility] [address]      Dear Colleague,    Thank you for referring your patient, Bee Ernst, to the [facility] [place]. Please see a copy of my visit note below.    Bee Ernst is a 64 year old who is being evaluated via a billable telephone visit.      What phone number would you like to be contacted at? [phone]  How would you like to obtain your AVS? [facility]        Medical  Weight Loss Follow-Up Diet Evaluation  Assessment:  Bee is presenting today for a follow up weight management nutrition consultation.  This patient has had an initial appointment and was referred by Dr. Brenner for MNT as treatment for Morbid obesity  Weight loss medication: Phentermine.   Pt's weight is 270 lbs  Initial weight: 278.8 lbs  Weight change: 8.8 lbs, 3.3% weight loss         No data to display              BMI: 44.55  Ideal body weight: 57.6 kg (126 lb 15.4 oz)  Adjusted ideal body weight: 85.1 kg (187 lb 11.1 oz)    Estimated RMR (Crockett-St Jeor equation):   1,820 kcals x 1.2 (sedentary) = 2,180 kcals (for weight maintenance)  Recommended Protein Intake: 70 - 80 grams of protein/day  Patient Active Problem List:  Patient Active Problem List   Diagnosis     BMI 39.0-39.9,adult     Chronic Reflux Esophagitis     Changed Sexual Interest (Libido): Decreased     Papillary Carcinoma Of The Thyroid Gland     Postsurgical Hypothyroidism     Skin Tag (___ Mm)     Essential hypertension     Hyperlipidemia     Prediabetes     Morbid obesity (H)     Gastroesophageal reflux disease     History of malignant neoplasm of thyroid     Hypothyroidism     Inflammatory arthritis     Other specified abnormal immunological findings in serum     Raynaud's disease     Sciatica     Benign neoplasm of ascending colon     Diaphragmatic hernia     Elevated antinuclear antibody (MARLEE) level     Fibromyalgia     Epigastric burning sensation     Insomnia     Pain of  "hand     Polyp of colon     Systemic lupus erythematosus (H)     Adenomatous polyp of colon, unspecified part of colon     History of hysterectomy     Gastroesophageal reflux disease without esophagitis     Dyslipidemia     Autoimmune disorder (H24)     Metabolic syndrome     Achilles tendonitis     Plantar fasciitis     Severe obesity (BMI >= 40) (H)     Headache     Thyroid cancer (H)   Diabetes: no    Nutrition History:   Weight loss history:  Watching Portions or Calories, Exercise, Weight Watchers, Atkins-type Diet (Low Carb/High Protein), Nutrisystem, Medications, Medifast, GOLO - liked this plan - it focuses on \"clean eating\"  -patient feels like the phentermine has helped with her energy and appetite  -patient has smaller portions d/t GERD  -patient will cook separately from  d/t him eating a lot of steak - mainly with dinner  -daughter had bariatric surgery - bariatric surgery is not covered by insurance and injectable medications are not covered by insurance.   Personal Goals: Patient has been struggling with weight loss and feels trapped in the loop of weight loss and weight gain. Patient has been at her heaviest weight within the past couple of years. Patient had various health diagnoses within the past few of years.     Progress on goals from last visit: Patient went to [place] for a couple of weeks - they have a place there and have been doing remodeling. Patient tried to stay busy and was mindful of her intake. Patient is noticing that if she does not eat enough - she will feel sick as a hunger signal. Patient is planning on reducing the restrictions with the clean eating diet. Patient plans to complete chair exercises.     Goals:   Aim to have 20+ grams of protein per meal  Fuel your weight loss by having a meal/snack every 4-6 hours    Dietary Recall:  *limits artificial sweeteners and chemicals in foods  Breakfast: bagel with cream cheese  Lunch:corn tortilla with refried beans and " cheese  Eating out: nuts, fruit, Atkins bar  Beverages:   Coffee with raw sugar and half/half  Water - 4 tall glass - ~64 ounces  Exercise: Has a FitBit  Not set exercise routine at this time  Patient has looked at chair yoga videos - plans to start slow - has not started d/t [place] trip  Patient is hoping to do water aerobics when she is down in [place] as well  Nutrition Diagnosis:    Morbid obesity related to excessive energy intake as evidence by BMI of 44.55    Intervention:  Food and/or nutrient delivery: try adding in chair exercises  Nutrition education: low impact workout videos, Apps for Tracking, recipe options    Monitoring/Evaluation:    Goals:  Start chair exercises and continue walking during the week  Track intake - do spot checks during the week rather than daily  Try adding in collagen powder or unflavored protein powder to your coffee    Patient to follow up in 1 month(s) with bariatrician and 3 month(s) with ELLE      Phone call duration: 27 minutes      Distant Location (provider location):  Off-site    Mode of Communication:  Telephone      Nicole Hratman RD           Again, thank you for allowing me to participate in the care of your patient.        Sincerely,        Nicole Hartman RD

## 2024-09-10 NOTE — PROGRESS NOTES
Bee Ernst is a 64 year old who is being evaluated via a billable telephone visit.      What phone number would you like to be contacted at? [phone]  How would you like to obtain your AVS? [facility]        Medical  Weight Loss Follow-Up Diet Evaluation  Assessment:  Bee is presenting today for a follow up weight management nutrition consultation.  This patient has had an initial appointment and was referred by Dr. Brenner for MNT as treatment for Morbid obesity  Weight loss medication: Phentermine.   Pt's weight is 270 lbs  Initial weight: 278.8 lbs  Weight change: 8.8 lbs, 3.3% weight loss         No data to display              BMI: 44.55  Ideal body weight: 57.6 kg (126 lb 15.4 oz)  Adjusted ideal body weight: 85.1 kg (187 lb 11.1 oz)    Estimated RMR (Canton-St Jeor equation):   1,820 kcals x 1.2 (sedentary) = 2,180 kcals (for weight maintenance)  Recommended Protein Intake: 70 - 80 grams of protein/day  Patient Active Problem List:  Patient Active Problem List   Diagnosis    BMI 39.0-39.9,adult    Chronic Reflux Esophagitis    Changed Sexual Interest (Libido): Decreased    Papillary Carcinoma Of The Thyroid Gland    Postsurgical Hypothyroidism    Skin Tag (___ Mm)    Essential hypertension    Hyperlipidemia    Prediabetes    Morbid obesity (H)    Gastroesophageal reflux disease    History of malignant neoplasm of thyroid    Hypothyroidism    Inflammatory arthritis    Other specified abnormal immunological findings in serum    Raynaud's disease    Sciatica    Benign neoplasm of ascending colon    Diaphragmatic hernia    Elevated antinuclear antibody (MARLEE) level    Fibromyalgia    Epigastric burning sensation    Insomnia    Pain of hand    Polyp of colon    Systemic lupus erythematosus (H)    Adenomatous polyp of colon, unspecified part of colon    History of hysterectomy    Gastroesophageal reflux disease without esophagitis    Dyslipidemia    Autoimmune disorder (H24)    Metabolic syndrome    Achilles  "tendonitis    Plantar fasciitis    Severe obesity (BMI >= 40) (H)    Headache    Thyroid cancer (H)   Diabetes: no    Nutrition History:   Weight loss history:  Watching Portions or Calories, Exercise, Weight Watchers, Atkins-type Diet (Low Carb/High Protein), Nutrisystem, Medications, Medifast, GOLO - liked this plan - it focuses on \"clean eating\"  -patient feels like the phentermine has helped with her energy and appetite  -patient has smaller portions d/t GERD  -patient will cook separately from  d/t him eating a lot of steak - mainly with dinner  -daughter had bariatric surgery - bariatric surgery is not covered by insurance and injectable medications are not covered by insurance.   Personal Goals: Patient has been struggling with weight loss and feels trapped in the loop of weight loss and weight gain. Patient has been at her heaviest weight within the past couple of years. Patient had various health diagnoses within the past few of years.     Progress on goals from last visit: Patient went to [place] for a couple of weeks - they have a place there and have been doing remodeling. Patient tried to stay busy and was mindful of her intake. Patient is noticing that if she does not eat enough - she will feel sick as a hunger signal. Patient is planning on reducing the restrictions with the clean eating diet. Patient plans to complete chair exercises.     Goals:   Aim to have 20+ grams of protein per meal  Fuel your weight loss by having a meal/snack every 4-6 hours    Dietary Recall:  *limits artificial sweeteners and chemicals in foods  Breakfast: bagel with cream cheese  Lunch:corn tortilla with refried beans and cheese  Eating out: nuts, fruit, Atkins bar  Beverages:   Coffee with raw sugar and half/half  Water - 4 tall glass - ~64 ounces  Exercise: Has a FitBit  Not set exercise routine at this time  Patient has looked at chair yoga videos - plans to start slow - has not started d/t [place] trip  Patient " is hoping to do water aerobics when she is down in [place] as well  Nutrition Diagnosis:    Morbid obesity related to excessive energy intake as evidence by BMI of 44.55    Intervention:  Food and/or nutrient delivery: try adding in chair exercises  Nutrition education: low impact workout videos, Apps for Tracking, recipe options    Monitoring/Evaluation:    Goals:  Start chair exercises and continue walking during the week  Track intake - do spot checks during the week rather than daily  Try adding in collagen powder or unflavored protein powder to your coffee    Patient to follow up in 1 month(s) with bariatrician and 3 month(s) with RD      Phone call duration: 27 minutes      Distant Location (provider location):  Off-site    Mode of Communication:  Telephone      Nicole Hartman RD

## 2024-09-10 NOTE — PATIENT INSTRUCTIONS
Low-Impact Workout Videos (click on title for link)  Improved Health - effective low impact workouts (gentle, beginner and intermediate level workouts)  Senior Shape with Kimberly -  easy to follow, low impact exercises for beginners  Vitality Life with Freddy Cordero - exercises are completed seated, standing or a combination of both making the exercise just right for them  Crystal Fit Pilates -  easy to follow Pilates, Strength & Cardio workouts - look for wall pilates workouts here!  Nourish.Move.Love - quick + effective home workouts that are 10-30 minutes  Body Project - low impact workout videos that includes HIIT cardio, resistance training, pilates and yoga  SilverSneakers  - low impact workout videos  Walk at Home - low impact, easy to follow movements to walk in place  [id number] - easy to follow, seated or standing workouts for beginners!     Apps for Tracking:  Fooducate  MyFitnessPal   My Food Diary  Lose It!  MyNetDiary  Cronometer  MyTummy  Spokin (for food allergies)  mySymptoms (for food intolerances)  Fig (scanning foods for food allergies and intolerances)  Baritastic (bariatric surgery pre/post-op)     On-the-Go Breakfast Ideas  As of 2015, the latest research shows what a huge impact eating breakfast has on losing weight and feeling your best. People lose more weight when they make breakfast their biggest meal of the day compared to Dinner, but even if you cannot go to that degree, getting a breakfast that has at least 20 grams of protein and even a moderate amount of fat is ideal for maintaining good energy through the day and limits overeating in the evening hours.  The following are some quick and easy suggestions for at least getting something of substance into your body in the morning.  Enjoy!    Eating breakfast within 90 minutes of waking up is an important part of taking care of your body on a restricted calorie diet plan.  After sleeping for hours, your body is in need of fuel.  An ideal  breakfast is a combination of protein, whole-grain carbohydrates, or fruit.  Here s why:    -Protein digests very slowly in the body, helping you feel more satisfied.  -Whole grains provide dietary fiber, which also digests slowly and helps keep your gut clean.  -Fruit is a great source of vitamins, minerals, and fiber.     Each one of these breakfast combinations has between 200-300 calories and 15-20 grams of protein.  Feel free to mix and match!    Bone Broth (chicken bone broth or beef bone broth) is a great way to boost protein content. 8oz of bone broth will typically have 9-12grams of protein for 40kcal of energy.    Protein: Choose  -1/2 cup low-fat cottage cheese  -2 hard boiled eggs , or one cooked in olive oil (low/slow heat).  -1 low fat string cheese stick  -1 Tablespoon natural peanut butter  -TradeTools FX vegetarian sausage edouard (found in freezer section)  -1 slice lowfat cheese  -6 oz 2% or lowfat Greek yogurt, such as Fage or Oikos.    PLUS    Whole Grains:  Choose   -1 whole wheat English muffin  -1 whole wheat pramod, half  -1/2 Fiber One frozen muffin, thawed  -1/2 Fiber One toaster pastry  -1 whole wheat bagel thin  -1/2 cup Kashi cereal  -1 Kashi waffle (or other whole grain high-fiber waffle)  Aim for whole grain/sprouted breads with at least 3g of fiber/slice if having bread. [place] is one such brand.    OR    Fruit: Choose  -1/3 cup blueberries  -1/2 banana (or a plantain- similar to a banana, yet smaller)  -1/2 cup cantaloupe cubes  -1 small apple  -1 small orange  -1/2 cup strawberries  -handful raspberries/blackberries (each berry is about 1 calorie).    *Adapted from Diabetes Living, Fall 20    Ten Breakfasts Under 250 calories    Ideally, getting between 350-600 calories  (depending on starting height and weight)for breakfast is ideal for avoiding hunger later in the day, adjust/add to the following accordingly:    One- 250 calories, 8.5 g protein  1 slice whole-grain toast    1 Tbsp peanut butter    banana    Two- 250 calories, 8 g protein    cup nonfat/lowfat yogurt  1/3rd cup diced no-sugar peaches  1/3rd cup cereal (like Special K, Cheerios, or bran flakes)    Three- 250 calories, 25 g protein  1 egg scrambled with 1 oz skim milk    cup shredded cheddar    whole grain English muffin  1 oz [place] leonard  1 tsp margarine spread    Four- 225 calories, 25 g protein  1/2 cup Kashi Go-Lean cereal    cup skim milk mixed with 1 scoop Bariatric Advantage protein powder    cup no-sugar diced pears    Five- 250 calories, 20 g protein    cup oatmeal prepared with skim milk, 1 scoop protein powder, and sugar-free maple syrup    Six- 200 calories, 5 g protein  1 whole grain waffle, toasted  1 tablespoon creamy peanut or almond butter    Seven-  250 calories, 19 g protein  Breakfast sandwich: 1 slice whole grain toast, cut in half.  Add 1 scrambled egg and one slice cheddar  cheese.    Eight-  250 calories, 15 g protein  2 eggs scrambled with 1/3 cup frozen spinach (heat before adding to eggs) and 2 tablespoons low fat cream cheese.    Nine-  150 calories, 15 g protein  2/3rd cup cottage cheese    cup cantaloupe    Ten- 200 calories, 20 g protein  Fruit smoothie made with 4 oz. nonfat Greek yogurt,   cup berries, 1 scoop protein powder, and 4 oz skim milk.    Ten Lunches Under 250 Calories    Aim for lunch to be around 300-400 calories a day when trying to lose weight and get that protein in!    One- 200 calories, 11 g protein  1/3 cup tuna salad made with light cantor on 1 slice whole grain bread  1 small peeled apple    Two- 250 calories, 16 g protein  1/3 cup lowfat cottage cheese    cup cooked green beans    small fruit cocktail (in natural juice)    Three- 200 calories, 11 g protein    grilled cheese sandwich on whole grain bread with lowfat cheese  2/3rd cup of tomato soup    Four- 250 calories, 22 g protein  Deli wrap: 1 oz sliced turkey, 1 oz sliced ham, 1 oz sliced chicken rolled up with 1  slice low-fat cheese  1 small orange    Five- 250 calories, 28 g protein  2/3rd cup chili with 1 oz shredded cheese  4 saltine crackers    Six- 250 calories, 22 g protein  1 cup fresh spinach with 2 oz chicken, 1/3rd cup mandarin oranges, and 2 tablespoons sliced almonds with 1 tablespoon  vinaigrette dressing    Seven- 200 calories, 11 g protein  1 Tbsp sugar-free preserves and 1 Tbsp peanut butter on 1 slice whole grain toast    cup nonfat/lowfat Greek yogurt    Eight- 250 calories, 18 g protein  1 small soft-shell chicken taco with 1 oz shredded cheese, lettuce, tomato, salsa, and 1 Tbsp light sour cream    cup black beans    Nine- 225 calories, 13 g protein  2 ounces baked chicken  1/4 cup mashed potatoes    cup green beans    Ten- 200 calories, 21 g protein  Deli pramod: 2 oz roast beef or other deli meat with 1 tsp López mayonnaise and sliced tomato, onion, and lettuce  1/3rd cup cottage cheese      Ten Dinners Under 300 calories    If you're eating a large breakfast and medium lunch, keep dinner small.  300-400 calories is ideal for most people depending on their caloric needs.    One- 300 calories, 12 g protein  1-inch thick slice of turkey meatloaf    cup baked butternut squash    Two- 200 calories, 9 g protein  Bread-less BLT: 3 slices turkey leonard, sliced tomato, wrapped in a large lettuce leaf    cup peeled fruit    Three- 275 calories, 36 g protein  3 oz roasted chicken    cup cooked broccoli    cup shredded cheddar cheese    cup unsweetened applesauce    Four- 200 calories, 25 g protein  3 oz baked tilapia  1/3rd cup cooked carrots    cup yogurt    Five- 250 calories, 20 g protein  Grilled ham  n  Swiss: spread 2 tsp ghee or butter on 1 slice of whole grain bread.  Cut bread in half, layer 2 oz deli ham with 1 piece of Swiss cheese and grill until cheese is melted.    cup cooked vegetables    Six- 250 calories, 18 g protein  Vegetarian cheeseburger: 1 [place] cheeseburger topped with lettuce, onion, tomato,  and ketchup/mustard    cup sweet potato fries    Seven- 250 calories, 18 g protein  Pork pot roast: 2 oz roasted pork loin, 1/3rd cup roasted carrots,   medium potato, cooked with   cup gravy    Eight- 330 calories, 25 g protein  2 oz meatballs (about 2 small meatballs)    cup spaghetti sauce  1/2 piece toast topped with 1 tsp ghee or butterand topped with garlic powder, toasted in oven    Nine- 250 calories, 16 g protein  Mexican pizza: one 8  corn tortilla topped with 2 oz chicken,   cup salsa, 2 tablespoons black beans, 2 tablespoons shredded cheese.  Bake until cheese is melted.    Ten- 250 calories, 22 g protein  Shrimp stir-purdy: 3 oz cooked shrimp, 1/6th onion,   pepper,   cup chopped carrots sautéed in 1 tablespoon olive oil, topped with 2 tablespoons stir purdy sauce and a pinch of sesame seeds        150 Calories or Less Snack Ideas   1 hardboiled egg with   cup berries  1 small apple with 1 hardboiled egg  10 almonds with   cup berries  2 clementines with 1 light string cheese  1 light string cheese with   sliced apple  1 light string cheese wrapped in 2 slices of turkey  4 100% whole wheat crackers (e.g. Triscuit) with 1 light string cheese    c. cottage cheese with   cup fruit and 1 Tbsp sunflower seeds     cup cottage cheese with   of an avocado     can tuna fish with 1 cup sliced cucumbers     cup roasted garbanzo beans with paprika and cayenne pepper    baked sweet potato with   cup chili beans or   cup cottage cheese  2 oz. nitrate free turkey slices with 1 cup carrots  1 container (6 oz) of low sugar (less than 10 grams of sugar) greek yogurt   3 Tablespoons of hummus with 1 cup sliced bell peppers   2 Tablespoons of hummus with 15 baby carrots  4 Tablespoons ranch dip made with plain Greek Yogurt and 3 mini cucumbers  1/4 cup nuts (any kind)  1 Tablespoon peanut butter with 1 stalk celery   1 dill pickle wrapped in 1-2 slices of deli ham with 1 tsp of mayonnaise/mustard.

## 2024-09-17 ENCOUNTER — LAB REQUISITION (OUTPATIENT)
Dept: LAB | Facility: CLINIC | Age: 64
End: 2024-09-17

## 2024-09-17 DIAGNOSIS — E78.5 HYPERLIPIDEMIA, UNSPECIFIED: ICD-10-CM

## 2024-09-17 DIAGNOSIS — I10 ESSENTIAL (PRIMARY) HYPERTENSION: ICD-10-CM

## 2024-09-17 LAB
ALBUMIN SERPL BCG-MCNC: 4.5 G/DL (ref 3.5–5.2)
ALP SERPL-CCNC: 76 U/L (ref 40–150)
ALT SERPL W P-5'-P-CCNC: 29 U/L (ref 0–50)
ANION GAP SERPL CALCULATED.3IONS-SCNC: 13 MMOL/L (ref 7–15)
AST SERPL W P-5'-P-CCNC: 27 U/L (ref 0–45)
BILIRUB SERPL-MCNC: 0.4 MG/DL
BUN SERPL-MCNC: 15.5 MG/DL (ref 8–23)
CALCIUM SERPL-MCNC: 9.4 MG/DL (ref 8.8–10.4)
CHLORIDE SERPL-SCNC: 105 MMOL/L (ref 98–107)
CHOLEST SERPL-MCNC: 139 MG/DL
CREAT SERPL-MCNC: 0.68 MG/DL (ref 0.51–0.95)
EGFRCR SERPLBLD CKD-EPI 2021: >90 ML/MIN/1.73M2
FASTING STATUS PATIENT QL REPORTED: ABNORMAL
FASTING STATUS PATIENT QL REPORTED: NORMAL
GLUCOSE SERPL-MCNC: 94 MG/DL (ref 70–99)
HCO3 SERPL-SCNC: 23 MMOL/L (ref 22–29)
HDLC SERPL-MCNC: 45 MG/DL
LDLC SERPL CALC-MCNC: 54 MG/DL
NONHDLC SERPL-MCNC: 94 MG/DL
POTASSIUM SERPL-SCNC: 4.4 MMOL/L (ref 3.4–5.3)
PROT SERPL-MCNC: 7.4 G/DL (ref 6.4–8.3)
SODIUM SERPL-SCNC: 141 MMOL/L (ref 135–145)
TRIGL SERPL-MCNC: 200 MG/DL

## 2024-09-17 PROCEDURE — 80053 COMPREHEN METABOLIC PANEL: CPT | Performed by: FAMILY MEDICINE

## 2024-09-17 PROCEDURE — 80061 LIPID PANEL: CPT | Performed by: FAMILY MEDICINE

## 2024-09-29 ENCOUNTER — HEALTH MAINTENANCE LETTER (OUTPATIENT)
Age: 64
End: 2024-09-29

## 2025-04-06 ENCOUNTER — HEALTH MAINTENANCE LETTER (OUTPATIENT)
Age: 65
End: 2025-04-06